# Patient Record
Sex: MALE | URBAN - METROPOLITAN AREA
[De-identification: names, ages, dates, MRNs, and addresses within clinical notes are randomized per-mention and may not be internally consistent; named-entity substitution may affect disease eponyms.]

---

## 2020-07-25 ENCOUNTER — TELEPHONE ENCOUNTER (OUTPATIENT)
Dept: URBAN - METROPOLITAN AREA CLINIC 13 | Facility: CLINIC | Age: 85
End: 2020-07-25

## 2020-07-26 ENCOUNTER — TELEPHONE ENCOUNTER (OUTPATIENT)
Dept: URBAN - METROPOLITAN AREA CLINIC 13 | Facility: CLINIC | Age: 85
End: 2020-07-26

## 2021-08-04 PROCEDURE — U0003 INFECTIOUS AGENT DETECTION BY NUCLEIC ACID (DNA OR RNA); SEVERE ACUTE RESPIRATORY SYNDROME CORONAVIRUS 2 (SARS-COV-2) (CORONAVIRUS DISEASE [COVID-19]), AMPLIFIED PROBE TECHNIQUE, MAKING USE OF HIGH THROUGHPUT TECHNOLOGIES AS DESCRIBED BY CMS-2020-01-R: HCPCS | Performed by: INTERNAL MEDICINE

## 2021-08-04 PROCEDURE — U0005 INFEC AGEN DETEC AMPLI PROBE: HCPCS | Performed by: INTERNAL MEDICINE

## 2021-08-05 ENCOUNTER — LAB REQUISITION (OUTPATIENT)
Dept: LAB | Facility: HOSPITAL | Age: 86
End: 2021-08-05
Payer: MEDICARE

## 2021-08-05 DIAGNOSIS — Z20.828 CONTACT WITH AND (SUSPECTED) EXPOSURE TO OTHER VIRAL COMMUNICABLE DISEASES: ICD-10-CM

## 2021-08-05 LAB — SARS-COV-2 RNA RESP QL NAA+PROBE: NEGATIVE

## 2021-10-11 PROCEDURE — U0005 INFEC AGEN DETEC AMPLI PROBE: HCPCS | Performed by: INTERNAL MEDICINE

## 2021-10-11 PROCEDURE — U0003 INFECTIOUS AGENT DETECTION BY NUCLEIC ACID (DNA OR RNA); SEVERE ACUTE RESPIRATORY SYNDROME CORONAVIRUS 2 (SARS-COV-2) (CORONAVIRUS DISEASE [COVID-19]), AMPLIFIED PROBE TECHNIQUE, MAKING USE OF HIGH THROUGHPUT TECHNOLOGIES AS DESCRIBED BY CMS-2020-01-R: HCPCS | Performed by: INTERNAL MEDICINE

## 2021-10-12 ENCOUNTER — LAB REQUISITION (OUTPATIENT)
Dept: LAB | Facility: HOSPITAL | Age: 86
End: 2021-10-12
Payer: MEDICARE

## 2021-10-12 DIAGNOSIS — Z20.828 CONTACT WITH AND (SUSPECTED) EXPOSURE TO OTHER VIRAL COMMUNICABLE DISEASES: ICD-10-CM

## 2021-10-12 LAB — SARS-COV-2 RNA RESP QL NAA+PROBE: NEGATIVE

## 2021-12-13 PROCEDURE — U0005 INFEC AGEN DETEC AMPLI PROBE: HCPCS | Performed by: INTERNAL MEDICINE

## 2021-12-13 PROCEDURE — U0003 INFECTIOUS AGENT DETECTION BY NUCLEIC ACID (DNA OR RNA); SEVERE ACUTE RESPIRATORY SYNDROME CORONAVIRUS 2 (SARS-COV-2) (CORONAVIRUS DISEASE [COVID-19]), AMPLIFIED PROBE TECHNIQUE, MAKING USE OF HIGH THROUGHPUT TECHNOLOGIES AS DESCRIBED BY CMS-2020-01-R: HCPCS | Performed by: INTERNAL MEDICINE

## 2021-12-14 ENCOUNTER — LAB REQUISITION (OUTPATIENT)
Dept: LAB | Facility: HOSPITAL | Age: 86
End: 2021-12-14
Payer: MEDICARE

## 2021-12-14 DIAGNOSIS — Z11.59 ENCOUNTER FOR SCREENING FOR OTHER VIRAL DISEASES: ICD-10-CM

## 2021-12-14 DIAGNOSIS — Z03.818 ENCOUNTER FOR OBSERVATION FOR SUSPECTED EXPOSURE TO OTHER BIOLOGICAL AGENTS RULED OUT: ICD-10-CM

## 2021-12-14 LAB — SARS-COV-2 RNA RESP QL NAA+PROBE: NEGATIVE

## 2021-12-22 PROCEDURE — U0005 INFEC AGEN DETEC AMPLI PROBE: HCPCS | Performed by: INTERNAL MEDICINE

## 2021-12-22 PROCEDURE — U0003 INFECTIOUS AGENT DETECTION BY NUCLEIC ACID (DNA OR RNA); SEVERE ACUTE RESPIRATORY SYNDROME CORONAVIRUS 2 (SARS-COV-2) (CORONAVIRUS DISEASE [COVID-19]), AMPLIFIED PROBE TECHNIQUE, MAKING USE OF HIGH THROUGHPUT TECHNOLOGIES AS DESCRIBED BY CMS-2020-01-R: HCPCS | Performed by: INTERNAL MEDICINE

## 2021-12-23 ENCOUNTER — LAB REQUISITION (OUTPATIENT)
Dept: LAB | Facility: HOSPITAL | Age: 86
End: 2021-12-23
Payer: MEDICARE

## 2021-12-23 DIAGNOSIS — Z11.59 ENCOUNTER FOR SCREENING FOR OTHER VIRAL DISEASES: ICD-10-CM

## 2021-12-23 DIAGNOSIS — Z03.818 ENCOUNTER FOR OBSERVATION FOR SUSPECTED EXPOSURE TO OTHER BIOLOGICAL AGENTS RULED OUT: ICD-10-CM

## 2021-12-23 DIAGNOSIS — Z20.828 CONTACT WITH AND (SUSPECTED) EXPOSURE TO OTHER VIRAL COMMUNICABLE DISEASES: ICD-10-CM

## 2021-12-23 LAB — SARS-COV-2 RNA RESP QL NAA+PROBE: NEGATIVE

## 2022-01-28 PROCEDURE — U0003 INFECTIOUS AGENT DETECTION BY NUCLEIC ACID (DNA OR RNA); SEVERE ACUTE RESPIRATORY SYNDROME CORONAVIRUS 2 (SARS-COV-2) (CORONAVIRUS DISEASE [COVID-19]), AMPLIFIED PROBE TECHNIQUE, MAKING USE OF HIGH THROUGHPUT TECHNOLOGIES AS DESCRIBED BY CMS-2020-01-R: HCPCS | Performed by: INTERNAL MEDICINE

## 2022-01-28 PROCEDURE — U0005 INFEC AGEN DETEC AMPLI PROBE: HCPCS | Performed by: INTERNAL MEDICINE

## 2022-01-29 ENCOUNTER — LAB REQUISITION (OUTPATIENT)
Dept: LAB | Facility: HOSPITAL | Age: 87
End: 2022-01-29
Payer: MEDICARE

## 2022-01-29 DIAGNOSIS — Z11.59 ENCOUNTER FOR SCREENING FOR OTHER VIRAL DISEASES: ICD-10-CM

## 2022-01-29 DIAGNOSIS — Z20.828 CONTACT WITH AND (SUSPECTED) EXPOSURE TO OTHER VIRAL COMMUNICABLE DISEASES: ICD-10-CM

## 2022-01-29 DIAGNOSIS — Z03.818 ENCOUNTER FOR OBSERVATION FOR SUSPECTED EXPOSURE TO OTHER BIOLOGICAL AGENTS RULED OUT: ICD-10-CM

## 2022-01-29 LAB — SARS-COV-2 RNA RESP QL NAA+PROBE: NEGATIVE

## 2022-02-10 ENCOUNTER — LAB REQUISITION (OUTPATIENT)
Dept: LAB | Facility: HOSPITAL | Age: 87
End: 2022-02-10
Payer: MEDICARE

## 2022-02-10 DIAGNOSIS — Z03.818 ENCOUNTER FOR OBSERVATION FOR SUSPECTED EXPOSURE TO OTHER BIOLOGICAL AGENTS RULED OUT: ICD-10-CM

## 2022-02-10 DIAGNOSIS — Z20.828 CONTACT WITH AND (SUSPECTED) EXPOSURE TO OTHER VIRAL COMMUNICABLE DISEASES: ICD-10-CM

## 2022-02-10 DIAGNOSIS — Z11.59 ENCOUNTER FOR SCREENING FOR OTHER VIRAL DISEASES: ICD-10-CM

## 2022-02-10 PROCEDURE — U0005 INFEC AGEN DETEC AMPLI PROBE: HCPCS | Performed by: INTERNAL MEDICINE

## 2022-02-10 PROCEDURE — U0003 INFECTIOUS AGENT DETECTION BY NUCLEIC ACID (DNA OR RNA); SEVERE ACUTE RESPIRATORY SYNDROME CORONAVIRUS 2 (SARS-COV-2) (CORONAVIRUS DISEASE [COVID-19]), AMPLIFIED PROBE TECHNIQUE, MAKING USE OF HIGH THROUGHPUT TECHNOLOGIES AS DESCRIBED BY CMS-2020-01-R: HCPCS | Performed by: INTERNAL MEDICINE

## 2022-02-11 LAB — SARS-COV-2 RNA RESP QL NAA+PROBE: NEGATIVE

## 2022-02-16 PROCEDURE — U0005 INFEC AGEN DETEC AMPLI PROBE: HCPCS | Performed by: INTERNAL MEDICINE

## 2022-02-16 PROCEDURE — U0003 INFECTIOUS AGENT DETECTION BY NUCLEIC ACID (DNA OR RNA); SEVERE ACUTE RESPIRATORY SYNDROME CORONAVIRUS 2 (SARS-COV-2) (CORONAVIRUS DISEASE [COVID-19]), AMPLIFIED PROBE TECHNIQUE, MAKING USE OF HIGH THROUGHPUT TECHNOLOGIES AS DESCRIBED BY CMS-2020-01-R: HCPCS | Performed by: INTERNAL MEDICINE

## 2022-02-17 ENCOUNTER — LAB REQUISITION (OUTPATIENT)
Dept: LAB | Facility: HOSPITAL | Age: 87
End: 2022-02-17
Payer: MEDICARE

## 2022-02-17 DIAGNOSIS — Z20.828 CONTACT WITH AND (SUSPECTED) EXPOSURE TO OTHER VIRAL COMMUNICABLE DISEASES: ICD-10-CM

## 2022-02-17 LAB — SARS-COV-2 RNA RESP QL NAA+PROBE: NEGATIVE

## 2022-05-12 PROCEDURE — U0005 INFEC AGEN DETEC AMPLI PROBE: HCPCS | Performed by: INTERNAL MEDICINE

## 2022-05-12 PROCEDURE — U0003 INFECTIOUS AGENT DETECTION BY NUCLEIC ACID (DNA OR RNA); SEVERE ACUTE RESPIRATORY SYNDROME CORONAVIRUS 2 (SARS-COV-2) (CORONAVIRUS DISEASE [COVID-19]), AMPLIFIED PROBE TECHNIQUE, MAKING USE OF HIGH THROUGHPUT TECHNOLOGIES AS DESCRIBED BY CMS-2020-01-R: HCPCS | Performed by: INTERNAL MEDICINE

## 2022-05-13 ENCOUNTER — LAB REQUISITION (OUTPATIENT)
Dept: LAB | Facility: HOSPITAL | Age: 87
End: 2022-05-13
Payer: MEDICARE

## 2022-05-13 DIAGNOSIS — Z11.59 ENCOUNTER FOR SCREENING FOR OTHER VIRAL DISEASES: ICD-10-CM

## 2022-05-13 LAB — SARS-COV-2 RNA RESP QL NAA+PROBE: NEGATIVE

## 2022-05-19 PROCEDURE — U0003 INFECTIOUS AGENT DETECTION BY NUCLEIC ACID (DNA OR RNA); SEVERE ACUTE RESPIRATORY SYNDROME CORONAVIRUS 2 (SARS-COV-2) (CORONAVIRUS DISEASE [COVID-19]), AMPLIFIED PROBE TECHNIQUE, MAKING USE OF HIGH THROUGHPUT TECHNOLOGIES AS DESCRIBED BY CMS-2020-01-R: HCPCS | Performed by: INTERNAL MEDICINE

## 2022-05-19 PROCEDURE — U0005 INFEC AGEN DETEC AMPLI PROBE: HCPCS | Performed by: INTERNAL MEDICINE

## 2022-05-20 ENCOUNTER — LAB REQUISITION (OUTPATIENT)
Dept: LAB | Facility: HOSPITAL | Age: 87
End: 2022-05-20
Payer: MEDICARE

## 2022-05-20 DIAGNOSIS — Z11.59 ENCOUNTER FOR SCREENING FOR OTHER VIRAL DISEASES: ICD-10-CM

## 2022-05-20 DIAGNOSIS — Z20.828 CONTACT WITH AND (SUSPECTED) EXPOSURE TO OTHER VIRAL COMMUNICABLE DISEASES: ICD-10-CM

## 2022-05-20 DIAGNOSIS — Z03.818 ENCOUNTER FOR OBSERVATION FOR SUSPECTED EXPOSURE TO OTHER BIOLOGICAL AGENTS RULED OUT: ICD-10-CM

## 2022-05-21 LAB — SARS-COV-2 RNA RESP QL NAA+PROBE: NEGATIVE

## 2022-05-26 PROCEDURE — U0003 INFECTIOUS AGENT DETECTION BY NUCLEIC ACID (DNA OR RNA); SEVERE ACUTE RESPIRATORY SYNDROME CORONAVIRUS 2 (SARS-COV-2) (CORONAVIRUS DISEASE [COVID-19]), AMPLIFIED PROBE TECHNIQUE, MAKING USE OF HIGH THROUGHPUT TECHNOLOGIES AS DESCRIBED BY CMS-2020-01-R: HCPCS | Performed by: INTERNAL MEDICINE

## 2022-05-26 PROCEDURE — U0005 INFEC AGEN DETEC AMPLI PROBE: HCPCS | Performed by: INTERNAL MEDICINE

## 2022-05-27 ENCOUNTER — LAB REQUISITION (OUTPATIENT)
Dept: LAB | Facility: HOSPITAL | Age: 87
End: 2022-05-27
Payer: MEDICARE

## 2022-05-27 DIAGNOSIS — Z20.828 CONTACT WITH AND (SUSPECTED) EXPOSURE TO OTHER VIRAL COMMUNICABLE DISEASES: ICD-10-CM

## 2022-05-27 LAB — SARS-COV-2 RNA RESP QL NAA+PROBE: NEGATIVE

## 2022-06-05 PROCEDURE — U0003 INFECTIOUS AGENT DETECTION BY NUCLEIC ACID (DNA OR RNA); SEVERE ACUTE RESPIRATORY SYNDROME CORONAVIRUS 2 (SARS-COV-2) (CORONAVIRUS DISEASE [COVID-19]), AMPLIFIED PROBE TECHNIQUE, MAKING USE OF HIGH THROUGHPUT TECHNOLOGIES AS DESCRIBED BY CMS-2020-01-R: HCPCS | Performed by: INTERNAL MEDICINE

## 2022-06-05 PROCEDURE — U0005 INFEC AGEN DETEC AMPLI PROBE: HCPCS | Performed by: INTERNAL MEDICINE

## 2022-06-06 ENCOUNTER — LAB REQUISITION (OUTPATIENT)
Dept: LAB | Facility: HOSPITAL | Age: 87
End: 2022-06-06
Payer: MEDICARE

## 2022-06-06 DIAGNOSIS — Z20.828 CONTACT WITH AND (SUSPECTED) EXPOSURE TO OTHER VIRAL COMMUNICABLE DISEASES: ICD-10-CM

## 2022-06-06 LAB — SARS-COV-2 RNA RESP QL NAA+PROBE: NEGATIVE

## 2022-06-30 ENCOUNTER — HOSPITAL ENCOUNTER (EMERGENCY)
Facility: HOSPITAL | Age: 87
Discharge: HOME | End: 2022-06-30
Attending: EMERGENCY MEDICINE
Payer: MEDICARE

## 2022-06-30 ENCOUNTER — APPOINTMENT (EMERGENCY)
Dept: CT IMAGING | Facility: HOSPITAL | Age: 87
End: 2022-06-30
Payer: MEDICARE

## 2022-06-30 ENCOUNTER — APPOINTMENT (EMERGENCY)
Dept: RADIOLOGY | Facility: HOSPITAL | Age: 87
End: 2022-06-30
Payer: MEDICARE

## 2022-06-30 VITALS
RESPIRATION RATE: 18 BRPM | HEART RATE: 91 BPM | SYSTOLIC BLOOD PRESSURE: 132 MMHG | TEMPERATURE: 98.5 F | DIASTOLIC BLOOD PRESSURE: 78 MMHG | OXYGEN SATURATION: 91 %
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
## 2022-06-30 DIAGNOSIS — S86.912A MUSCLE STRAIN OF LEFT LOWER LEG, INITIAL ENCOUNTER: ICD-10-CM

## 2022-06-30 LAB
ABO GROUP BLD: NORMAL
ABO GROUP BLD: NORMAL
ANION GAP SERPL CALCULATED.3IONS-SCNC: 11 MMOL/L (ref 4–13)
BASOPHILS # BLD AUTO: 0.02 THOUSANDS/ΜL (ref 0–0.1)
BASOPHILS NFR BLD AUTO: 0 % (ref 0–1)
BLD GP AB SCN SERPL QL: NEGATIVE
BUN SERPL-MCNC: 22 MG/DL (ref 5–25)
CALCIUM SERPL-MCNC: 9.5 MG/DL (ref 8.3–10.1)
CHLORIDE SERPL-SCNC: 104 MMOL/L (ref 100–108)
CO2 SERPL-SCNC: 22 MMOL/L (ref 21–32)
CREAT SERPL-MCNC: 1.38 MG/DL (ref 0.6–1.3)
EOSINOPHIL # BLD AUTO: 0.14 THOUSAND/ΜL (ref 0–0.61)
EOSINOPHIL NFR BLD AUTO: 2 % (ref 0–6)
ERYTHROCYTE [DISTWIDTH] IN BLOOD BY AUTOMATED COUNT: 13.3 % (ref 11.6–15.1)
GFR SERPL CREATININE-BSD FRML MDRD: 42 ML/MIN/1.73SQ M
GLUCOSE SERPL-MCNC: 120 MG/DL (ref 65–140)
HCT VFR BLD AUTO: 45.3 % (ref 36.5–49.3)
HGB BLD-MCNC: 15.3 G/DL (ref 12–17)
IMM GRANULOCYTES # BLD AUTO: 0.03 THOUSAND/UL (ref 0–0.2)
IMM GRANULOCYTES NFR BLD AUTO: 0 % (ref 0–2)
LYMPHOCYTES # BLD AUTO: 0.92 THOUSANDS/ΜL (ref 0.6–4.47)
LYMPHOCYTES NFR BLD AUTO: 13 % (ref 14–44)
MCH RBC QN AUTO: 31.5 PG (ref 26.8–34.3)
MCHC RBC AUTO-ENTMCNC: 33.8 G/DL (ref 31.4–37.4)
MCV RBC AUTO: 93 FL (ref 82–98)
MONOCYTES # BLD AUTO: 0.65 THOUSAND/ΜL (ref 0.17–1.22)
MONOCYTES NFR BLD AUTO: 10 % (ref 4–12)
NEUTROPHILS # BLD AUTO: 5.09 THOUSANDS/ΜL (ref 1.85–7.62)
NEUTS SEG NFR BLD AUTO: 75 % (ref 43–75)
NRBC BLD AUTO-RTO: 0 /100 WBCS
PLATELET # BLD AUTO: 173 THOUSANDS/UL (ref 149–390)
PMV BLD AUTO: 8.4 FL (ref 8.9–12.7)
POTASSIUM SERPL-SCNC: 3.8 MMOL/L (ref 3.5–5.3)
RBC # BLD AUTO: 4.86 MILLION/UL (ref 3.88–5.62)
RH BLD: POSITIVE
RH BLD: POSITIVE
SODIUM SERPL-SCNC: 137 MMOL/L (ref 136–145)
SPECIMEN EXPIRATION DATE: NORMAL
WBC # BLD AUTO: 6.85 THOUSAND/UL (ref 4.31–10.16)

## 2022-06-30 PROCEDURE — 99284 EMERGENCY DEPT VISIT MOD MDM: CPT

## 2022-06-30 PROCEDURE — 86901 BLOOD TYPING SEROLOGIC RH(D): CPT | Performed by: EMERGENCY MEDICINE

## 2022-06-30 PROCEDURE — 36415 COLL VENOUS BLD VENIPUNCTURE: CPT | Performed by: EMERGENCY MEDICINE

## 2022-06-30 PROCEDURE — 73590 X-RAY EXAM OF LOWER LEG: CPT

## 2022-06-30 PROCEDURE — 86900 BLOOD TYPING SEROLOGIC ABO: CPT | Performed by: EMERGENCY MEDICINE

## 2022-06-30 PROCEDURE — 72170 X-RAY EXAM OF PELVIS: CPT

## 2022-06-30 PROCEDURE — 99285 EMERGENCY DEPT VISIT HI MDM: CPT | Performed by: EMERGENCY MEDICINE

## 2022-06-30 PROCEDURE — 71045 X-RAY EXAM CHEST 1 VIEW: CPT

## 2022-06-30 PROCEDURE — G1004 CDSM NDSC: HCPCS

## 2022-06-30 PROCEDURE — 71250 CT THORAX DX C-: CPT

## 2022-06-30 PROCEDURE — 86850 RBC ANTIBODY SCREEN: CPT | Performed by: EMERGENCY MEDICINE

## 2022-06-30 PROCEDURE — 80048 BASIC METABOLIC PNL TOTAL CA: CPT | Performed by: EMERGENCY MEDICINE

## 2022-06-30 PROCEDURE — 85025 COMPLETE CBC W/AUTO DIFF WBC: CPT | Performed by: EMERGENCY MEDICINE

## 2022-06-30 RX ORDER — ACETAMINOPHEN 325 MG/1
650 TABLET ORAL ONCE
Status: COMPLETED | OUTPATIENT
Start: 2022-06-30 | End: 2022-06-30

## 2022-06-30 RX ORDER — NAPROXEN 250 MG/1
250 TABLET ORAL 2 TIMES DAILY WITH MEALS
Qty: 8 TABLET | Refills: 0 | Status: SHIPPED | OUTPATIENT
Start: 2022-06-30 | End: 2022-07-28

## 2022-06-30 RX ORDER — NAPROXEN 250 MG/1
250 TABLET ORAL 2 TIMES DAILY WITH MEALS
Qty: 8 TABLET | Refills: 0 | Status: SHIPPED | OUTPATIENT
Start: 2022-06-30 | End: 2022-06-30 | Stop reason: SDUPTHER

## 2022-06-30 RX ADMIN — ACETAMINOPHEN 650 MG: 325 TABLET, FILM COATED ORAL at 11:22

## 2022-06-30 NOTE — TRAUMA DOCUMENTATION
Patient unable to bare weight on left leg for ambulation  Provider aware and was told to call nursing home to see if they can accommodate  Was transferred to [facility] ([phone]) and got no answer  Will try again shortly

## 2022-06-30 NOTE — ED PROVIDER NOTES
Emergency Department Trauma Note  Bernard Cornejo 80 y o  male MRN: [id number]  Unit/Bed#: Z1 H3/Z1 H3 Encounter: [id number]      Trauma Alert: Trauma Acuity: Trauma Evaluation  Model of Arrival:   via    Trauma Team: Current Providers  Attending Provider: Malika Angulo DO  Registered Nurse: Jefry Gilbert, RN  Registered Nurse: Gabriella Freeman RN  Consultants:     None      History of Present Illness     Chief Complaint:   Chief Complaint   Patient presents with    Fall     Patient presents to the ER with left lower leg pain post falling yesterday afternoon  Patient states:  "I can't bear any weight on my leg "     HPI:  Bernard Cornejo is a 80 y o  male who presents with left leg pain  Mechanism:Details of Incident: Patient presents to the ER with left lower leg extremity pain post falling at [facility] yesterday afternoon  Injury Date: 06/29/22 Injury Time: 1400 Injury Occurence Location - [address] [place] [address] Way: [facility]    51-year-old man says he was sleeping in his chair  When he got up to go to the bathroom he twisted and fell to the ground  He has no pain until he moves  When he moves he has pain in left tib-fib region  Unable to stand due to pain  Denies injuring head, neck, chest, abdomen, pelvis, back or other extremities  Does have chronic back pain  On no thinners, but does take aspirin 81 milligrams daily  Denies dyspnea and focal neurologic changes  Review of Systems   Constitutional: Negative for fever and unexpected weight change  HENT: Positive for hearing loss  Respiratory: Negative for cough and shortness of breath  Cardiovascular: Negative for chest pain and palpitations  Gastrointestinal: Negative for abdominal pain, blood in stool, diarrhea and vomiting  Genitourinary: Negative for dysuria and flank pain  Musculoskeletal: Positive for back pain, gait problem and myalgias  Neurological: Positive for weakness   Negative for syncope, numbness and headaches  All other systems reviewed and are negative  Historical Information     Immunizations:   Immunization History   Administered Date(s) Administered    COVID-19 MODERNA VACC 0 5 ML IM 01/28/2021, 03/08/2021       Past Medical History:   Diagnosis Date    CKD (chronic kidney disease) stage 3, GFR 30-59 ml/min ([facility])     Essential (primary) hypertension     Hyperlipidemia     Polyp of colon     Venous insufficiency of lower extremity, unspecified laterality      History reviewed  No pertinent family history  History reviewed  No pertinent surgical history  Social History     Tobacco Use    Smoking status: Never Smoker    Smokeless tobacco: Never Used   Vaping Use    Vaping Use: Never used   Substance Use Topics    Drug use: Never     E-Cigarette/Vaping    E-Cigarette Use Never User      E-Cigarette/Vaping Substances       Family History: non-contributory    Meds/Allergies   None       No Known Allergies    PHYSICAL EXAM    PE limited by: left leg pain with weight beqring    Objective   Vitals:   First set: Temperature: 98 5 °F (36 9 °C) (06/30/22 0737)  Pulse: 96 (06/30/22 0737)  Respirations: 18 (06/30/22 0737)  Blood Pressure: (!) 182/89 (06/30/22 0737)  SpO2: 92 % (06/30/22 0737)    Primary Survey:   (A) Airway: normal vocalizations  (B) Breathing: symmetric air entry and chest expansion  (C) Circulation: Pulses:   normal  (D) Disabliity:  GCS Total:  14  (E) Expose:  Completed    Secondary Survey: (Click on Physical Exam tab above)  Physical Exam  Vitals and nursing note reviewed  Constitutional:       General: He is not in acute distress  Appearance: He is well-developed  He is not ill-appearing or diaphoretic  HENT:      Head: Normocephalic and atraumatic  Right Ear: External ear normal       Left Ear: External ear normal       Nose: Nose normal    Eyes:      General: No scleral icterus       Conjunctiva/sclera: Conjunctivae normal       Pupils: Pupils are equal, round, and reactive to light  Neck:      Vascular: No JVD  Cardiovascular:      Rate and Rhythm: Normal rate and regular rhythm  Pulses: Normal pulses  Heart sounds: Normal heart sounds  No murmur heard  Pulmonary:      Effort: Pulmonary effort is normal       Breath sounds: Normal breath sounds  Chest:      Chest wall: No tenderness  Abdominal:      General: Bowel sounds are normal       Palpations: Abdomen is soft  There is no mass  Tenderness: There is no abdominal tenderness  There is no guarding or rebound  Musculoskeletal:         General: No swelling, tenderness or deformity  Normal range of motion  Cervical back: Normal range of motion and neck supple  No rigidity or tenderness  Right lower leg: No edema  Left lower leg: No edema  Lymphadenopathy:      Cervical: No cervical adenopathy  Skin:     General: Skin is warm and dry  Capillary Refill: Capillary refill takes less than 2 seconds  Findings: No lesion or rash  Neurological:      Mental Status: He is alert and oriented to person, place, and time  Mental status is at baseline  Cranial Nerves: No cranial nerve deficit (except for hearing loss)  Sensory: No sensory deficit  Motor: No weakness  Coordination: Coordination normal       Gait: Gait abnormal (unable to weight bear d/t left lower leg pain  )  Deep Tendon Reflexes: Reflexes are normal and symmetric  Reflexes normal    Psychiatric:         Mood and Affect: Mood normal          Behavior: Behavior normal          Cervical spine cleared by clinical criteria?  Yes     Invasive Devices  Report    None                 Lab Results:   Results Reviewed     Procedure Component Value Units Date/Time    Basic metabolic panel [[id number]]  (Abnormal) Collected: 06/30/22 0758    Lab Status: Final result Specimen: Blood from Arm, Right Updated: 06/30/22 0190     Sodium 137 mmol/L      Potassium 3 8 mmol/L      Chloride 104 mmol/L      CO2 22 mmol/L      ANION GAP 11 mmol/L      BUN 22 mg/dL      Creatinine 1 38 mg/dL      Glucose 120 mg/dL      Calcium 9 5 mg/dL      eGFR 42 ml/min/1 73sq m     Narrative:      National Kidney Disease Foundation guidelines for Chronic Kidney Disease (CKD):     Stage 1 with normal or high GFR (GFR > 90 mL/min/1 73 square meters)    Stage 2 Mild CKD (GFR = 60-89 mL/min/1 73 square meters)    Stage 3A Moderate CKD (GFR = 45-59 mL/min/1 73 square meters)    Stage 3B Moderate CKD (GFR = 30-44 mL/min/1 73 square meters)    Stage 4 Severe CKD (GFR = 15-29 mL/min/1 73 square meters)    Stage 5 End Stage CKD (GFR <15 mL/min/1 73 square meters)  Note: GFR calculation is accurate only with a steady state creatinine    CBC and differential [[id number]]  (Abnormal) Collected: 06/30/22 0758    Lab Status: Final result Specimen: Blood from Arm, Right Updated: 06/30/22 0805     WBC 6 85 Thousand/uL      RBC 4 86 Million/uL      Hemoglobin 15 3 g/dL      Hematocrit 45 3 %      MCV 93 fL      MCH 31 5 pg      MCHC 33 8 g/dL      RDW 13 3 %      MPV 8 4 fL      Platelets 564 Thousands/uL      nRBC 0 /100 WBCs      Neutrophils Relative 75 %      Immat GRANS % 0 %      Lymphocytes Relative 13 %      Monocytes Relative 10 %      Eosinophils Relative 2 %      Basophils Relative 0 %      Neutrophils Absolute 5 09 Thousands/µL      Immature Grans Absolute 0 03 Thousand/uL      Lymphocytes Absolute 0 92 Thousands/µL      Monocytes Absolute 0 65 Thousand/µL      Eosinophils Absolute 0 14 Thousand/µL      Basophils Absolute 0 02 Thousands/µL                  Imaging Studies:   Direct to CT: No  CT chest without contrast   Final Result by Radha Cho MD (06/30 1022)      1  Widened mediastinum appearance on x-ray corresponds to tortuous vasculature  2   Mild bibasilar density likely representing atelectasis        3    Indeterminate hyperdense right renal lesion with additional probable bilateral cysts and hepatic cysts  Follow-up nonemergent complete abdomen ultrasound is recommended  Considerations related to the patient's age and/or comorbidities may be used    to alter this recommendation  4   Cholelithiasis  Workstation performed: [id number]         XR tibia fibula 2 views LEFT   ED Interpretation by Farnaz Coombs DO (06/30 4825)   No acute fracture      Final Result by Devin Lara DO (06/30 1009)      No acute osseous abnormality  Workstation performed: [id number]         XR Trauma chest portable   Final Result by Yoan Ames DO (06/30 4864)      Widening of the superior mediastinum, nonspecific  Noncontrast CT chest may be considered for further evaluation  Increased opacity in the medial lung bases could represent atelectasis or infiltrates  The study was marked in [facility] for immediate notification  Workstation performed: [id number]         XR Trauma pelvis ap only 1 or 2 vw   Final Result by Yoan Ames DO (06/30 5301)      No fracture  Followup imaging may be considered for any persistent or worsening symptoms  Workstation performed: [id number]               Procedures  Procedures         ED Course           MDM  Number of Diagnoses or Management Options  Fall, initial encounter: new and requires workup  Muscle strain of left lower leg, initial encounter: new and requires workup  Diagnosis management comments: Mechanical ground-level fall  Only injury is to left lower leg  Patient otherwise stable and at baseline  Check labs, EKG, imaging  Labs at patient's normal values  No fracture/dislocation noted  Chest CT reassuring  Patient's nursing home able to attend to patient's PT/OT needs  Will accept him back via wheelchair van             Amount and/or Complexity of Data Reviewed  Clinical lab tests: ordered and reviewed  Tests in the radiology section of CPT®: ordered and reviewed  Review and summarize past medical records: yes  Independent visualization of images, tracings, or specimens: yes            Disposition  [facility] One Transfer: No  Final diagnoses:   Fall, initial encounter   Muscle strain of left lower leg, initial encounter     Time reflects when diagnosis was documented in both MDM as applicable and the Disposition within this note     Time User Action Codes Description Comment    6/30/2022 10:57 AM Evi Kothari Add [Y10  GUMG] Fall, initial encounter     6/30/2022 10:57 AM Evi Kothari Add [P14 750F] Muscle strain of left lower leg, initial encounter       ED Disposition     ED Disposition   Discharge    Condition   Stable    Date/Time   Thu Jun 30, 2022 10:57 AM    Comment   Andree Nava discharge to home/self care  Follow-up Information     Follow up With Specialties Details Why Contact Info    Your PCP  Call  As needed         Discharge Medication List as of 6/30/2022 11:37 AM      START taking these medications    Details   naproxen (Naprosyn) 250 mg tablet Take 1 tablet (250 mg total) by mouth 2 (two) times a day with meals for 4 days, Starting Thu 6/30/2022, Until Mon 7/4/2022, Print           No discharge procedures on file      PDMP Review     None          ED Provider  Electronically Signed by         Kristine Ocampo DO  07/02/22 9519

## 2022-07-01 ENCOUNTER — HOSPITAL ENCOUNTER (EMERGENCY)
Facility: HOSPITAL | Age: 87
Discharge: HOME | End: 2022-07-01
Attending: EMERGENCY MEDICINE
Payer: MEDICARE

## 2022-07-01 VITALS
TEMPERATURE: 97.7 F | DIASTOLIC BLOOD PRESSURE: 78 MMHG | OXYGEN SATURATION: 93 % | SYSTOLIC BLOOD PRESSURE: 153 MMHG | HEART RATE: 87 BPM | RESPIRATION RATE: 18 BRPM

## 2022-07-01 DIAGNOSIS — I95.9 HYPOTENSION, UNSPECIFIED HYPOTENSION TYPE: Primary | ICD-10-CM

## 2022-07-01 LAB
ATRIAL RATE: 95 BPM
P AXIS: 37 DEGREES
PR INTERVAL: 180 MS
QRS AXIS: -89 DEGREES
QRSD INTERVAL: 74 MS
QT INTERVAL: 318 MS
QTC INTERVAL: 399 MS
T WAVE AXIS: 47 DEGREES
VENTRICULAR RATE: 95 BPM

## 2022-07-01 PROCEDURE — 99284 EMERGENCY DEPT VISIT MOD MDM: CPT

## 2022-07-01 PROCEDURE — 93005 ELECTROCARDIOGRAM TRACING: CPT

## 2022-07-01 PROCEDURE — 99283 EMERGENCY DEPT VISIT LOW MDM: CPT | Performed by: EMERGENCY MEDICINE

## 2022-07-01 PROCEDURE — 93010 ELECTROCARDIOGRAM REPORT: CPT | Performed by: INTERNAL MEDICINE

## 2022-07-01 NOTE — ED PROVIDER NOTES
History  Chief Complaint   Patient presents with    Hypotension     Patient presents to the ED with c/o hypotension, son states patient was seen yesterday for a fall and when the staff at [address] [place] [address] was checking him out this AM his vital signs (BP and o2) were low      80year-old male, presents with son from assisted living facility for reported low blood pressure  Patient was seen in ED yesterday for fall  Patient is awake and alert and denies any complaints, signs states they checked his blood pressure earlier today and noted to be low  They checked it again at the facility and the blood pressure returned to normal, the facility still recommended patient be evaluated in emergency department  Patient denies any complaints, no lightheadedness, chest pain, or shortness of breath  History provided by:  Patient and relative   used: No        Prior to Admission Medications   Prescriptions Last Dose Informant Patient Reported? Taking?   naproxen (Naprosyn) 250 mg tablet   No No   Sig: Take 1 tablet (250 mg total) by mouth 2 (two) times a day with meals for 4 days      Facility-Administered Medications: None       Past Medical History:   Diagnosis Date    CKD (chronic kidney disease) stage 3, GFR 30-59 ml/min ([facility])     Essential (primary) hypertension     Hyperlipidemia     Polyp of colon     Venous insufficiency of lower extremity, unspecified laterality        History reviewed  No pertinent surgical history  History reviewed  No pertinent family history  I have reviewed and agree with the history as documented  E-Cigarette/Vaping    E-Cigarette Use Never User      E-Cigarette/Vaping Substances     Social History     Tobacco Use    Smoking status: Never Smoker    Smokeless tobacco: Never Used   Vaping Use    Vaping Use: Never used   Substance Use Topics    Drug use: Never       Review of Systems   Constitutional: Negative  Negative for fever  Respiratory: Negative  Cardiovascular: Negative  Gastrointestinal: Negative  Neurological: Negative  Physical Exam  Physical Exam  Vitals and nursing note reviewed  Constitutional:       General: He is not in acute distress  HENT:      Head: Normocephalic and atraumatic  Cardiovascular:      Rate and Rhythm: Normal rate and regular rhythm  Pulmonary:      Effort: Pulmonary effort is normal       Breath sounds: Normal breath sounds  Abdominal:      Palpations: Abdomen is soft  Tenderness: There is no abdominal tenderness  Neurological:      General: No focal deficit present  Mental Status: He is alert  Mental status is at baseline  Vital Signs  ED Triage Vitals [07/01/22 1342]   Temperature Pulse Respirations Blood Pressure SpO2   97 7 °F (36 5 °C) 97 18 134/79 92 %      Temp Source Heart Rate Source Patient Position - Orthostatic VS BP Location FiO2 (%)   Temporal Monitor Sitting Right arm --      Pain Score       No Pain           Vitals:    07/01/22 1342 07/01/22 1621   BP: 134/79 153/78   Pulse: 97 87   Patient Position - Orthostatic VS: Sitting Sitting         Visual Acuity      ED Medications  Medications - No data to display    Diagnostic Studies  Results Reviewed     None                 No orders to display              Procedures  Procedures         ED Course                                             MDM  Number of Diagnoses or Management Options  Hypotension, unspecified hypotension type  Diagnosis management comments: 80year-old male, presents with episode of reported low blood pressure  Differential diagnosis includes dehydration, sepsis among other diagnosis  Patient's blood pressure has been normal in ED, patient is awake alert with no complaints  Blood work performed yesterday reviewed  No indication for further workup in the emergency department, discussed with son who was present and will discharge back to assisted living facility to which son is in agreement with  Amount and/or Complexity of Data Reviewed  Review and summarize past medical records: yes        Disposition  Final diagnoses:   Hypotension, unspecified hypotension type     Time reflects when diagnosis was documented in both MDM as applicable and the Disposition within this note     Time User Action Codes Description Comment    7/1/2022  4:20 PM Moody Dandy Add [I95 9] Hypotension, unspecified hypotension type       ED Disposition     ED Disposition   Discharge    Condition   Stable    Date/Time   Fri Jul 1, 2022  4:20 PM    Comment   Summer Gray discharge to home/self care  Follow-up Information    None         Discharge Medication List as of 7/1/2022  4:21 PM      CONTINUE these medications which have NOT CHANGED    Details   naproxen (Naprosyn) 250 mg tablet Take 1 tablet (250 mg total) by mouth 2 (two) times a day with meals for 4 days, Starting Thu 6/30/2022, Until Mon 7/4/2022, Print             No discharge procedures on file      PDMP Review     None          ED Provider  Electronically Signed by           Danilo Damico MD  07/01/22 8753

## 2022-07-25 ENCOUNTER — APPOINTMENT (EMERGENCY)
Dept: RADIOLOGY | Facility: HOSPITAL | Age: 87
DRG: 640 | End: 2022-07-25
Payer: MEDICARE

## 2022-07-25 ENCOUNTER — HOSPITAL ENCOUNTER (INPATIENT)
Facility: HOSPITAL | Age: 87
LOS: 2 days | Discharge: NURSING, REHAB OR LONG-TERM CARE | DRG: 640 | End: 2022-07-28
Attending: EMERGENCY MEDICINE | Admitting: INTERNAL MEDICINE
Payer: MEDICARE

## 2022-07-25 DIAGNOSIS — E16.2 HYPOGLYCEMIA: ICD-10-CM

## 2022-07-25 DIAGNOSIS — R79.89 DECREASED THYROID STIMULATING HORMONE (TSH) LEVEL: ICD-10-CM

## 2022-07-25 DIAGNOSIS — N17.9 AKI (ACUTE KIDNEY INJURY) (HCC): Primary | ICD-10-CM

## 2022-07-25 LAB — GLUCOSE SERPL-MCNC: 128 MG/DL (ref 65–140)

## 2022-07-25 PROCEDURE — 84484 ASSAY OF TROPONIN QUANT: CPT | Performed by: EMERGENCY MEDICINE

## 2022-07-25 PROCEDURE — 84439 ASSAY OF FREE THYROXINE: CPT | Performed by: EMERGENCY MEDICINE

## 2022-07-25 PROCEDURE — 1123F ACP DISCUSS/DSCN MKR DOCD: CPT | Performed by: EMERGENCY MEDICINE

## 2022-07-25 PROCEDURE — 83735 ASSAY OF MAGNESIUM: CPT | Performed by: EMERGENCY MEDICINE

## 2022-07-25 PROCEDURE — 85025 COMPLETE CBC W/AUTO DIFF WBC: CPT | Performed by: EMERGENCY MEDICINE

## 2022-07-25 PROCEDURE — 80053 COMPREHEN METABOLIC PANEL: CPT | Performed by: EMERGENCY MEDICINE

## 2022-07-25 PROCEDURE — 83036 HEMOGLOBIN GLYCOSYLATED A1C: CPT | Performed by: INTERNAL MEDICINE

## 2022-07-25 PROCEDURE — U0005 INFEC AGEN DETEC AMPLI PROBE: HCPCS | Performed by: EMERGENCY MEDICINE

## 2022-07-25 PROCEDURE — 83880 ASSAY OF NATRIURETIC PEPTIDE: CPT | Performed by: EMERGENCY MEDICINE

## 2022-07-25 PROCEDURE — U0003 INFECTIOUS AGENT DETECTION BY NUCLEIC ACID (DNA OR RNA); SEVERE ACUTE RESPIRATORY SYNDROME CORONAVIRUS 2 (SARS-COV-2) (CORONAVIRUS DISEASE [COVID-19]), AMPLIFIED PROBE TECHNIQUE, MAKING USE OF HIGH THROUGHPUT TECHNOLOGIES AS DESCRIBED BY CMS-2020-01-R: HCPCS | Performed by: EMERGENCY MEDICINE

## 2022-07-25 PROCEDURE — 71045 X-RAY EXAM CHEST 1 VIEW: CPT

## 2022-07-25 PROCEDURE — 99285 EMERGENCY DEPT VISIT HI MDM: CPT

## 2022-07-25 PROCEDURE — 93005 ELECTROCARDIOGRAM TRACING: CPT

## 2022-07-25 PROCEDURE — 84443 ASSAY THYROID STIM HORMONE: CPT | Performed by: EMERGENCY MEDICINE

## 2022-07-25 PROCEDURE — 99291 CRITICAL CARE FIRST HOUR: CPT | Performed by: EMERGENCY MEDICINE

## 2022-07-25 PROCEDURE — 36415 COLL VENOUS BLD VENIPUNCTURE: CPT | Performed by: EMERGENCY MEDICINE

## 2022-07-25 PROCEDURE — 82948 REAGENT STRIP/BLOOD GLUCOSE: CPT

## 2022-07-26 ENCOUNTER — APPOINTMENT (EMERGENCY)
Dept: CT IMAGING | Facility: HOSPITAL | Age: 87
DRG: 640 | End: 2022-07-26
Payer: MEDICARE

## 2022-07-26 PROBLEM — E16.2 HYPOGLYCEMIA: Status: ACTIVE | Noted: 2022-07-26

## 2022-07-26 PROBLEM — I10 ESSENTIAL HYPERTENSION: Status: ACTIVE | Noted: 2022-07-26

## 2022-07-26 PROBLEM — N17.9 ACUTE KIDNEY INJURY SUPERIMPOSED ON CHRONIC KIDNEY DISEASE (HCC): Status: ACTIVE | Noted: 2022-07-26

## 2022-07-26 PROBLEM — R65.10 SIRS (SYSTEMIC INFLAMMATORY RESPONSE SYNDROME) (HCC): Status: ACTIVE | Noted: 2022-07-26

## 2022-07-26 PROBLEM — F10.10 ALCOHOL ABUSE: Status: ACTIVE | Noted: 2022-07-26

## 2022-07-26 PROBLEM — N18.9 ACUTE KIDNEY INJURY SUPERIMPOSED ON CHRONIC KIDNEY DISEASE (HCC): Status: ACTIVE | Noted: 2022-07-26

## 2022-07-26 PROBLEM — E78.5 HYPERLIPIDEMIA: Status: ACTIVE | Noted: 2022-07-26

## 2022-07-26 PROBLEM — R41.82 AMS (ALTERED MENTAL STATUS): Status: ACTIVE | Noted: 2022-07-26

## 2022-07-26 PROBLEM — N40.0 BPH (BENIGN PROSTATIC HYPERPLASIA): Status: ACTIVE | Noted: 2022-07-26

## 2022-07-26 LAB
2HR DELTA HS TROPONIN: 4 NG/L
4HR DELTA HS TROPONIN: 6 NG/L
ALBUMIN SERPL BCP-MCNC: 3.4 G/DL (ref 3.5–5)
ALP SERPL-CCNC: 158 U/L (ref 46–116)
ALT SERPL W P-5'-P-CCNC: 12 U/L (ref 12–78)
ANION GAP SERPL CALCULATED.3IONS-SCNC: 10 MMOL/L (ref 4–13)
ANION GAP SERPL CALCULATED.3IONS-SCNC: 8 MMOL/L (ref 4–13)
AST SERPL W P-5'-P-CCNC: 13 U/L (ref 5–45)
ATRIAL RATE: 85 BPM
BASOPHILS # BLD AUTO: 0.01 THOUSANDS/ΜL (ref 0–0.1)
BASOPHILS # BLD AUTO: 0.02 THOUSANDS/ΜL (ref 0–0.1)
BASOPHILS NFR BLD AUTO: 0 % (ref 0–1)
BASOPHILS NFR BLD AUTO: 0 % (ref 0–1)
BILIRUB SERPL-MCNC: 0.5 MG/DL (ref 0.2–1)
BUN SERPL-MCNC: 41 MG/DL (ref 5–25)
BUN SERPL-MCNC: 46 MG/DL (ref 5–25)
CALCIUM ALBUM COR SERPL-MCNC: 9.7 MG/DL (ref 8.3–10.1)
CALCIUM SERPL-MCNC: 9.2 MG/DL (ref 8.3–10.1)
CALCIUM SERPL-MCNC: 9.4 MG/DL (ref 8.3–10.1)
CARDIAC TROPONIN I PNL SERPL HS: 11 NG/L
CARDIAC TROPONIN I PNL SERPL HS: 15 NG/L
CARDIAC TROPONIN I PNL SERPL HS: 17 NG/L
CHLORIDE SERPL-SCNC: 104 MMOL/L (ref 96–108)
CHLORIDE SERPL-SCNC: 105 MMOL/L (ref 96–108)
CO2 SERPL-SCNC: 24 MMOL/L (ref 21–32)
CO2 SERPL-SCNC: 25 MMOL/L (ref 21–32)
CREAT SERPL-MCNC: 1.94 MG/DL (ref 0.6–1.3)
CREAT SERPL-MCNC: 2.2 MG/DL (ref 0.6–1.3)
EOSINOPHIL # BLD AUTO: 0.03 THOUSAND/ΜL (ref 0–0.61)
EOSINOPHIL # BLD AUTO: 0.05 THOUSAND/ΜL (ref 0–0.61)
EOSINOPHIL NFR BLD AUTO: 1 % (ref 0–6)
EOSINOPHIL NFR BLD AUTO: 1 % (ref 0–6)
ERYTHROCYTE [DISTWIDTH] IN BLOOD BY AUTOMATED COUNT: 13.5 % (ref 11.6–15.1)
ERYTHROCYTE [DISTWIDTH] IN BLOOD BY AUTOMATED COUNT: 13.6 % (ref 11.6–15.1)
EST. AVERAGE GLUCOSE BLD GHB EST-MCNC: 111 MG/DL
GFR SERPL CREATININE-BSD FRML MDRD: 24 ML/MIN/1.73SQ M
GFR SERPL CREATININE-BSD FRML MDRD: 28 ML/MIN/1.73SQ M
GLUCOSE SERPL-MCNC: 100 MG/DL (ref 65–140)
GLUCOSE SERPL-MCNC: 100 MG/DL (ref 65–140)
GLUCOSE SERPL-MCNC: 109 MG/DL (ref 65–140)
GLUCOSE SERPL-MCNC: 109 MG/DL (ref 65–140)
GLUCOSE SERPL-MCNC: 110 MG/DL (ref 65–140)
GLUCOSE SERPL-MCNC: 118 MG/DL (ref 65–140)
GLUCOSE SERPL-MCNC: 118 MG/DL (ref 65–140)
GLUCOSE SERPL-MCNC: 120 MG/DL (ref 65–140)
GLUCOSE SERPL-MCNC: 120 MG/DL (ref 65–140)
GLUCOSE SERPL-MCNC: 122 MG/DL (ref 65–140)
GLUCOSE SERPL-MCNC: 123 MG/DL (ref 65–140)
GLUCOSE SERPL-MCNC: 146 MG/DL (ref 65–140)
GLUCOSE SERPL-MCNC: 37 MG/DL (ref 65–140)
GLUCOSE SERPL-MCNC: 57 MG/DL (ref 65–140)
GLUCOSE SERPL-MCNC: 62 MG/DL (ref 65–140)
GLUCOSE SERPL-MCNC: 67 MG/DL (ref 65–140)
GLUCOSE SERPL-MCNC: 73 MG/DL (ref 65–140)
GLUCOSE SERPL-MCNC: 82 MG/DL (ref 65–140)
GLUCOSE SERPL-MCNC: 82 MG/DL (ref 65–140)
GLUCOSE SERPL-MCNC: 83 MG/DL (ref 65–140)
GLUCOSE SERPL-MCNC: 85 MG/DL (ref 65–140)
GLUCOSE SERPL-MCNC: 86 MG/DL (ref 65–140)
GLUCOSE SERPL-MCNC: 92 MG/DL (ref 65–140)
GLUCOSE SERPL-MCNC: 93 MG/DL (ref 65–140)
GLUCOSE SERPL-MCNC: 96 MG/DL (ref 65–140)
HBA1C MFR BLD: 5.5 %
HCT VFR BLD AUTO: 40.8 % (ref 36.5–49.3)
HCT VFR BLD AUTO: 42.8 % (ref 36.5–49.3)
HGB BLD-MCNC: 13.6 G/DL (ref 12–17)
HGB BLD-MCNC: 14.3 G/DL (ref 12–17)
IMM GRANULOCYTES # BLD AUTO: 0.02 THOUSAND/UL (ref 0–0.2)
IMM GRANULOCYTES # BLD AUTO: 0.02 THOUSAND/UL (ref 0–0.2)
IMM GRANULOCYTES NFR BLD AUTO: 0 % (ref 0–2)
IMM GRANULOCYTES NFR BLD AUTO: 0 % (ref 0–2)
LACTATE SERPL-SCNC: 1.2 MMOL/L (ref 0.5–2)
LYMPHOCYTES # BLD AUTO: 0.56 THOUSANDS/ΜL (ref 0.6–4.47)
LYMPHOCYTES # BLD AUTO: 0.78 THOUSANDS/ΜL (ref 0.6–4.47)
LYMPHOCYTES NFR BLD AUTO: 12 % (ref 14–44)
LYMPHOCYTES NFR BLD AUTO: 9 % (ref 14–44)
MAGNESIUM SERPL-MCNC: 2.1 MG/DL (ref 1.6–2.6)
MCH RBC QN AUTO: 30.2 PG (ref 26.8–34.3)
MCH RBC QN AUTO: 31.4 PG (ref 26.8–34.3)
MCHC RBC AUTO-ENTMCNC: 33.3 G/DL (ref 31.4–37.4)
MCHC RBC AUTO-ENTMCNC: 33.4 G/DL (ref 31.4–37.4)
MCV RBC AUTO: 91 FL (ref 82–98)
MCV RBC AUTO: 94 FL (ref 82–98)
MONOCYTES # BLD AUTO: 0.6 THOUSAND/ΜL (ref 0.17–1.22)
MONOCYTES # BLD AUTO: 0.67 THOUSAND/ΜL (ref 0.17–1.22)
MONOCYTES NFR BLD AUTO: 10 % (ref 4–12)
MONOCYTES NFR BLD AUTO: 9 % (ref 4–12)
NEUTROPHILS # BLD AUTO: 4.99 THOUSANDS/ΜL (ref 1.85–7.62)
NEUTROPHILS # BLD AUTO: 5.29 THOUSANDS/ΜL (ref 1.85–7.62)
NEUTS SEG NFR BLD AUTO: 77 % (ref 43–75)
NEUTS SEG NFR BLD AUTO: 81 % (ref 43–75)
NRBC BLD AUTO-RTO: 0 /100 WBCS
NRBC BLD AUTO-RTO: 0 /100 WBCS
NT-PROBNP SERPL-MCNC: 157 PG/ML
P AXIS: 16 DEGREES
PLATELET # BLD AUTO: 219 THOUSANDS/UL (ref 149–390)
PLATELET # BLD AUTO: 227 THOUSANDS/UL (ref 149–390)
PMV BLD AUTO: 8.9 FL (ref 8.9–12.7)
PMV BLD AUTO: 8.9 FL (ref 8.9–12.7)
POTASSIUM SERPL-SCNC: 4.1 MMOL/L (ref 3.5–5.3)
POTASSIUM SERPL-SCNC: 4.2 MMOL/L (ref 3.5–5.3)
PR INTERVAL: 212 MS
PROCALCITONIN SERPL-MCNC: 0.1 NG/ML
PROT SERPL-MCNC: 6.9 G/DL (ref 6.4–8.4)
QRS AXIS: -56 DEGREES
QRSD INTERVAL: 82 MS
QT INTERVAL: 380 MS
QTC INTERVAL: 452 MS
RBC # BLD AUTO: 4.51 MILLION/UL (ref 3.88–5.62)
RBC # BLD AUTO: 4.55 MILLION/UL (ref 3.88–5.62)
SARS-COV-2 RNA RESP QL NAA+PROBE: NEGATIVE
SODIUM SERPL-SCNC: 136 MMOL/L (ref 135–147)
SODIUM SERPL-SCNC: 140 MMOL/L (ref 135–147)
T WAVE AXIS: 9 DEGREES
T4 FREE SERPL-MCNC: 1.28 NG/DL (ref 0.76–1.46)
TSH SERPL DL<=0.05 MIU/L-ACNC: 0.39 UIU/ML (ref 0.45–4.5)
VENTRICULAR RATE: 85 BPM
WBC # BLD AUTO: 6.52 THOUSAND/UL (ref 4.31–10.16)
WBC # BLD AUTO: 6.52 THOUSAND/UL (ref 4.31–10.16)

## 2022-07-26 PROCEDURE — 99222 1ST HOSP IP/OBS MODERATE 55: CPT | Performed by: INTERNAL MEDICINE

## 2022-07-26 PROCEDURE — 99223 1ST HOSP IP/OBS HIGH 75: CPT | Performed by: INTERNAL MEDICINE

## 2022-07-26 PROCEDURE — 80377 DRUG/SUBSTANCE NOS 7/MORE: CPT | Performed by: INTERNAL MEDICINE

## 2022-07-26 PROCEDURE — 87081 CULTURE SCREEN ONLY: CPT | Performed by: INTERNAL MEDICINE

## 2022-07-26 PROCEDURE — 84484 ASSAY OF TROPONIN QUANT: CPT | Performed by: EMERGENCY MEDICINE

## 2022-07-26 PROCEDURE — 85025 COMPLETE CBC W/AUTO DIFF WBC: CPT | Performed by: INTERNAL MEDICINE

## 2022-07-26 PROCEDURE — 36415 COLL VENOUS BLD VENIPUNCTURE: CPT | Performed by: EMERGENCY MEDICINE

## 2022-07-26 PROCEDURE — 87040 BLOOD CULTURE FOR BACTERIA: CPT | Performed by: EMERGENCY MEDICINE

## 2022-07-26 PROCEDURE — 82948 REAGENT STRIP/BLOOD GLUCOSE: CPT

## 2022-07-26 PROCEDURE — 83605 ASSAY OF LACTIC ACID: CPT | Performed by: EMERGENCY MEDICINE

## 2022-07-26 PROCEDURE — 84145 PROCALCITONIN (PCT): CPT | Performed by: EMERGENCY MEDICINE

## 2022-07-26 PROCEDURE — 97116 GAIT TRAINING THERAPY: CPT

## 2022-07-26 PROCEDURE — G1004 CDSM NDSC: HCPCS

## 2022-07-26 PROCEDURE — 80048 BASIC METABOLIC PNL TOTAL CA: CPT | Performed by: INTERNAL MEDICINE

## 2022-07-26 PROCEDURE — 93010 ELECTROCARDIOGRAM REPORT: CPT | Performed by: INTERNAL MEDICINE

## 2022-07-26 PROCEDURE — 97166 OT EVAL MOD COMPLEX 45 MIN: CPT

## 2022-07-26 PROCEDURE — 70450 CT HEAD/BRAIN W/O DYE: CPT

## 2022-07-26 PROCEDURE — 96360 HYDRATION IV INFUSION INIT: CPT

## 2022-07-26 PROCEDURE — 97163 PT EVAL HIGH COMPLEX 45 MIN: CPT

## 2022-07-26 RX ORDER — SIMVASTATIN 20 MG
20 TABLET ORAL EVERY EVENING
COMMUNITY

## 2022-07-26 RX ORDER — LORATADINE 10 MG/1
10 TABLET ORAL DAILY
COMMUNITY

## 2022-07-26 RX ORDER — TAMSULOSIN HYDROCHLORIDE 0.4 MG/1
0.4 CAPSULE ORAL
Status: DISCONTINUED | OUTPATIENT
Start: 2022-07-26 | End: 2022-07-28 | Stop reason: HOSPADM

## 2022-07-26 RX ORDER — ALLOPURINOL 300 MG/1
150 TABLET ORAL
COMMUNITY
Start: 2022-07-07

## 2022-07-26 RX ORDER — ASPIRIN 81 MG/1
81 TABLET ORAL DAILY
COMMUNITY
End: 2022-07-26

## 2022-07-26 RX ORDER — AMLODIPINE BESYLATE 5 MG/1
10 TABLET ORAL DAILY
Status: DISCONTINUED | OUTPATIENT
Start: 2022-07-26 | End: 2022-07-28 | Stop reason: HOSPADM

## 2022-07-26 RX ORDER — DEXTROSE AND SODIUM CHLORIDE 5; .9 G/100ML; G/100ML
125 INJECTION, SOLUTION INTRAVENOUS CONTINUOUS
Status: DISCONTINUED | OUTPATIENT
Start: 2022-07-26 | End: 2022-07-27

## 2022-07-26 RX ORDER — HEPARIN SODIUM 5000 [USP'U]/ML
5000 INJECTION, SOLUTION INTRAVENOUS; SUBCUTANEOUS EVERY 8 HOURS SCHEDULED
Status: DISCONTINUED | OUTPATIENT
Start: 2022-07-26 | End: 2022-07-28 | Stop reason: HOSPADM

## 2022-07-26 RX ORDER — DEXTROSE MONOHYDRATE 25 G/50ML
25 INJECTION, SOLUTION INTRAVENOUS ONCE
Status: COMPLETED | OUTPATIENT
Start: 2022-07-26 | End: 2022-07-26

## 2022-07-26 RX ORDER — TAMSULOSIN HYDROCHLORIDE 0.4 MG/1
0.4 CAPSULE ORAL
COMMUNITY

## 2022-07-26 RX ORDER — FOLIC ACID 1 MG/1
1 TABLET ORAL DAILY
Status: DISCONTINUED | OUTPATIENT
Start: 2022-07-26 | End: 2022-07-28 | Stop reason: HOSPADM

## 2022-07-26 RX ORDER — ALLOPURINOL 100 MG/1
150 TABLET ORAL
Status: DISCONTINUED | OUTPATIENT
Start: 2022-07-26 | End: 2022-07-28 | Stop reason: HOSPADM

## 2022-07-26 RX ORDER — PRAVASTATIN SODIUM 40 MG
40 TABLET ORAL
Status: DISCONTINUED | OUTPATIENT
Start: 2022-07-26 | End: 2022-07-28 | Stop reason: HOSPADM

## 2022-07-26 RX ORDER — DEXTROSE AND SODIUM CHLORIDE 5; .9 G/100ML; G/100ML
75 INJECTION, SOLUTION INTRAVENOUS CONTINUOUS
Status: DISCONTINUED | OUTPATIENT
Start: 2022-07-26 | End: 2022-07-26

## 2022-07-26 RX ORDER — AMLODIPINE BESYLATE 10 MG/1
10 TABLET ORAL DAILY
COMMUNITY

## 2022-07-26 RX ORDER — ONDANSETRON 2 MG/ML
4 INJECTION INTRAMUSCULAR; INTRAVENOUS EVERY 6 HOURS PRN
Status: DISCONTINUED | OUTPATIENT
Start: 2022-07-26 | End: 2022-07-28 | Stop reason: HOSPADM

## 2022-07-26 RX ORDER — LANOLIN ALCOHOL/MO/W.PET/CERES
100 CREAM (GRAM) TOPICAL DAILY
Status: DISCONTINUED | OUTPATIENT
Start: 2022-07-26 | End: 2022-07-28 | Stop reason: HOSPADM

## 2022-07-26 RX ORDER — LORATADINE 10 MG/1
10 TABLET ORAL EVERY OTHER DAY
Status: DISCONTINUED | OUTPATIENT
Start: 2022-07-26 | End: 2022-07-28 | Stop reason: HOSPADM

## 2022-07-26 RX ORDER — LISINOPRIL 20 MG/1
20 TABLET ORAL
COMMUNITY
Start: 2022-07-07

## 2022-07-26 RX ORDER — ACETAMINOPHEN 325 MG/1
650 TABLET ORAL EVERY 6 HOURS PRN
Status: DISCONTINUED | OUTPATIENT
Start: 2022-07-26 | End: 2022-07-28 | Stop reason: HOSPADM

## 2022-07-26 RX ADMIN — DEXTROSE AND SODIUM CHLORIDE 125 ML/HR: 5; .9 INJECTION, SOLUTION INTRAVENOUS at 17:22

## 2022-07-26 RX ADMIN — FOLIC ACID 1 MG: 1 TABLET ORAL at 09:01

## 2022-07-26 RX ADMIN — HEPARIN SODIUM 5000 UNITS: 5000 INJECTION INTRAVENOUS; SUBCUTANEOUS at 21:06

## 2022-07-26 RX ADMIN — Medication 1 TABLET: at 08:58

## 2022-07-26 RX ADMIN — DEXTROSE AND SODIUM CHLORIDE 75 ML/HR: 5; .9 INJECTION, SOLUTION INTRAVENOUS at 01:48

## 2022-07-26 RX ADMIN — HEPARIN SODIUM 5000 UNITS: 5000 INJECTION INTRAVENOUS; SUBCUTANEOUS at 14:16

## 2022-07-26 RX ADMIN — Medication 100 MG: at 08:59

## 2022-07-26 RX ADMIN — AMLODIPINE BESYLATE 10 MG: 5 TABLET ORAL at 08:59

## 2022-07-26 RX ADMIN — DEXTROSE MONOHYDRATE 25 ML: 25 INJECTION, SOLUTION INTRAVENOUS at 02:41

## 2022-07-26 RX ADMIN — DEXTROSE MONOHYDRATE 25 ML: 25 INJECTION, SOLUTION INTRAVENOUS at 03:24

## 2022-07-26 RX ADMIN — ALLOPURINOL 150 MG: 100 TABLET ORAL at 21:03

## 2022-07-26 RX ADMIN — DEXTROSE AND SODIUM CHLORIDE 125 ML/HR: 5; .9 INJECTION, SOLUTION INTRAVENOUS at 09:05

## 2022-07-26 RX ADMIN — SODIUM CHLORIDE 500 ML: 0.9 INJECTION, SOLUTION INTRAVENOUS at 01:35

## 2022-07-26 RX ADMIN — TAMSULOSIN HYDROCHLORIDE 0.4 MG: 0.4 CAPSULE ORAL at 16:33

## 2022-07-26 RX ADMIN — SODIUM CHLORIDE 100 ML/HR: 234 INJECTION, SOLUTION INTRAVENOUS at 02:57

## 2022-07-26 RX ADMIN — PRAVASTATIN SODIUM 40 MG: 40 TABLET ORAL at 16:33

## 2022-07-26 RX ADMIN — LORATADINE 10 MG: 10 TABLET ORAL at 09:01

## 2022-07-26 RX ADMIN — HEPARIN SODIUM 5000 UNITS: 5000 INJECTION INTRAVENOUS; SUBCUTANEOUS at 06:10

## 2022-07-26 NOTE — CASE MANAGEMENT
Case Management Assessment & Discharge Planning Note    Patient name Tyron Hernandez  Location /-27 MRN [id number]  : 1925 Date 2022       Current Admission Date: 2022  Current Admission Diagnosis:Hypoglycemia   Patient Active Problem List    Diagnosis Date Noted    Hypoglycemia 2022    AMS (altered mental status) 2022    Acute kidney injury superimposed on chronic kidney disease ([facility] [address] ) 2022    Essential hypertension 2022    Hyperlipidemia 2022    BPH (benign prostatic hyperplasia) 2022    SIRS (systemic inflammatory response syndrome) ([facility] [address] ) 2022    Alcohol abuse 2022      LOS (days): 0  Geometric Mean LOS (GMLOS) (days): 3 30  Days to GMLOS:3     OBJECTIVE:    Risk of Unplanned Readmission Score: 12 85     Current admission status: Inpatient    Preferred Pharmacy:   [address] [facility], [facility] [place] [place] [address],[address] [id number]  Phone: [phone] Fax: [address], [address] [address]  Phone: [phone] Fax: [phone]    Primary Care Provider: Isabel Gomez MD    Primary Insurance: MEDICARE  Secondary Insurance: [id number] [facility] [place] [place]    ASSESSMENT:  Active Health Care Proxies    There are no active Health Care Proxies on file         Advance Directives  Does patient have a Health Care POA?: Yes  Does patient have Advance Directives?: Yes  Advance Directives: Living will, Power of  for health care  Primary Contact: Gabi Salomon: dtr: [phone]    Readmission Root Cause  30 Day Readmission: No    Patient Information  Admitted from[de-identified] Facility ([facility])  Mental Status: Alert  During Assessment patient was accompanied by: Not accompanied during assessment  Assessment information provided by[de-identified] Daughter (Pt unavailable at time of visit)  Primary Caregiver: Self ([facility])  Support Systems: Spouse/significant other, Children, Family members  [place] of Residence: [address] [facility] do you live in?: [address] [facility] entry access options  Select all that apply : No steps to enter home  Type of Current Residence: Other (Comment) ([facility] assisted living)  In the last 12 months, was there a time when you were not able to pay the mortgage or rent on time?: No  In the last 12 months, how many places have you lived?: 1  In the last 12 months, was there a time when you did not have a steady place to sleep or slept in a shelter (including now)?: No  Homeless/housing insecurity resource given?: N/A  Living Arrangements: Lives w/ Spouse/significant other    Activities of Daily Living Prior to Admission  Functional Status: Independent  Completes ADLs independently?: Yes  Ambulates independently?: No  Level of ambulatory dependence: Assistance (uses wheelchair recently, able to ambulate with walker short distances)  Does patient use assisted devices?: Yes  Assisted Devices (DME) used:  Wheelchair, [employer]  Does patient currently own DME?: Yes  What DME does the patient currently own?: [place] [employer], Wheelchair  Does patient have a history of Outpatient Therapy (PT/OT)?: No  Does the patient have a history of Short-Term Rehab?: No  Does patient have a history of HHC?: No  Does patient currently have [facility]?: Yes (through [facility])    Patient Information Continued  Income Source: Pension/detention  Does patient have prescription coverage?: Yes  Within the past 12 months, you worried that your food would run out before you got the money to buy more : Never true  Within the past 12 months, the food you bought just didn't last and you didn't have money to get more : Never true  Food insecurity resource given?: N/A  Does patient receive dialysis treatments?: No  Does patient have a history of substance abuse?: Yes  Historical substance use preference: Alcohol/ETOH  Does patient have a history of Mental Health Diagnosis?: No    DISCHARGE DETAILS:    Discharge planning discussed with[de-identified] patient's dtr: Varsha Gross of Choice: Yes  Comments - Freedom of Choice: Pt's dtr requesting return to [facility] if possible  CM contacted family/caregiver?: Yes    Contacts  Patient Contacts: Love Paulino: dtr: [phone]  Relationship to Patient[de-identified] Family  Contact Method: Phone  Phone Number: [phone]  Reason/Outcome: Emergency Contact    Additional Comments: Attempted to meet with Pt, Pt unavailable at time of visit  Call plaecd to Pt's dtr([place]: [phone]), discussed role of   Pt's dtr reports Pt has been at [facility] with wife since 3/2021  Pt uses wheelchair and ambulates short distances with walker since hurting his foot a few weeks ago  Pt's dtr denies hx of VNA/SNF/MH&DA  Pt's dtr reports Pt gets home PT/OT through [facility]  Pt's dtr reports she is POA and Pt has living will  Pt's dtr's preference is for Pt to return to [facility] upon discharge if possible  Spoke with Urban Leos at [facility], confirmed Pt's uses wheelchair, ambulates short distances with walker and is independent with feeding and adls  CM to follow

## 2022-07-26 NOTE — ASSESSMENT & PLAN NOTE
· EMS called due to AMS  Upon exam found to have blood sugar of 33  Patient with no prior history of diabetes  Received D10 improvement of lead sugar   · Patient now alert oriented x3 in the ER   · CT head negative  · Currently no signs of infection    Not meeting SIRS criteria   · Monitor sugar closely  · Neuro checks   · Appears AMS was most likely due to hypoglycemia event

## 2022-07-26 NOTE — ASSESSMENT & PLAN NOTE
· Meeting SIRS criteria due to tachypnea and tachycardia   · During tachypneic episode patient found to have blood sugar less than 30  · COVID negative, CXR without signs infection  · Lactic, procalcitonin and blood cultures pending  · Continue to monitor without the use of antibiotics at this time

## 2022-07-26 NOTE — OCCUPATIONAL THERAPY NOTE
Occupational Therapy Evaluation     Patient Name: Bernard Cornejo  Today's Date: 7/26/2022  Problem List  Principal Problem:    Hypoglycemia  Active Problems:    AMS (altered mental status)    Acute kidney injury superimposed on chronic kidney disease ([address] )    Essential hypertension    Hyperlipidemia    SIRS (systemic inflammatory response syndrome) (HCC)    Alcohol abuse    Past Medical History  Past Medical History:   Diagnosis Date    CKD (chronic kidney disease) stage 3, GFR 30-59 ml/min (HCC)     Essential (primary) hypertension     Hyperlipidemia     Polyp of colon     Venous insufficiency of lower extremity, unspecified laterality      Past Surgical History  History reviewed  No pertinent surgical history  07/26/22 1410   OT Last Visit   OT Visit Date 07/26/22   Note Type   Note type Evaluation   Pain Assessment   Pain Assessment Tool 0-10   Pain Score No Pain   Home Living   Type of Home Assisted living  ([facility] [place])   Home Layout One level   Prior Function   Level of [place] Independent with ADLs and functional mobility   Lives With Spouse; Facility staff   Receives Help From Personal care attendant   ADL Assistance Independent   IADLs Needs assistance   Falls in the last 6 months 1 to 4   Subjective   Subjective Pt received in supine position  Pt agreeable to session   ADL   Eating Assistance 7  Independent   Eating Deficit Setup   Grooming Assistance 7  Independent   Grooming Deficit Setup   UB Bathing Assistance 5  Supervision/Setup   LB Bathing Assistance 4  Minimal Assistance   UB Dressing Assistance 5  Supervision/Setup   LB Dressing Assistance 4  Minimal Assistance   Toileting Assistance  4  Minimal Assistance   Bed Mobility   Supine to Sit 5  Supervision   Additional Comments Pt remained seated in recliner by end of session   Transfers   Sit to Stand 5  Supervision   Additional items Verbal cues; Impulsive   Stand to Sit 5  Supervision   Additional items Verbal cues   Functional Mobility   Functional Mobility 3  Moderate assistance  (Min-mod Ax 1 with RW  Pt with narrow WOODY )   Balance   Static Sitting Good   Dynamic Sitting Fair +   Static Standing Fair -   Dynamic Standing Poor +   Activity Tolerance   Activity Tolerance Patient tolerated treatment well   Medical Staff Made Aware PT Bushra   Nurse Made Aware GILBERTO GOMES Assessment   RUE Assessment WFL   LUE Assessment   LUE Assessment WFL   Cognition   Overall Cognitive Status Impaired   Arousal/Participation Alert   Attention Attends with cues to redirect   Orientation Level Oriented to person;Disoriented to place; Disoriented to time;Disoriented to situation   Memory Decreased short term memory;Decreased recall of recent events;Decreased recall of precautions   Assessment   Limitation Decreased ADL status; Decreased self-care trans;Decreased high-level ADLs; Decreased cognition   Prognosis Good   Assessment Pt is a 80 y o  male seen for OT evaluation at [facility], admitted 7/25/2022 w/ Hypoglycemia  OT completed expanded review of pt's medical and social history  Comorbidities affecting pt's functional performance at time of assessment include: hyperlipidemia, hypertension, CKD   Personal factors affecting pt at time of IE include:difficulty performing ADLS, difficulty performing IADLS  and decreased functional mobility  Prior to admission, pt was living at [facility] [place]   Pt was I w/  ADLS and required assist with IADLS, & required RW PTA  Upon evaluation: Pt requires sup for bed mobility, sup-mod A for functional mobility/transfers, sup for UB ADLs and min A for LB ADLS 2* the following deficits impacting occupational performance: weakness, decreased balance, impaired memory and impaired problem solving  Full objective findings from OT assessment regarding body systems outlined above  Pt to benefit from continued skilled OT tx while in the hospital to address deficits as defined above and maximize level of functional independence w ADL's and functional mobility  Occupational Performance areas to address include: bathing/shower, toilet hygiene, dressing and functional mobility  Based on findings, pt is of moderate complexity  The patient's raw score on the AM-PAC Daily Activity inpatient short form is 20, standardized score is 42 03, greater than 39 4  Patients at this level are likely to benefit from DC to home, which DOES NOT coincide with CURRENT above OT recommendations  However please refer to therapist recommendation for discharge planning given other factors that may influence destination  At this time, OT recommendations at time of discharge are short term rehab  Pt with increased difficulty with functional mobility  Required mod Ax 1 to maintain safety  OT to follow progress closely  Goals   Patient Goals Pt wishes to get home   Plan   Treatment Interventions ADL retraining;Cognitive reorientation;Patient/family training; Compensatory technique education; Functional transfer training   Goal Expiration Date 08/05/22   OT Treatment Day 0   OT Frequency 2-3x/wk   Recommendation   OT Discharge Recommendation Post acute rehabilitation services   AM-[facility] Daily Activity Inpatient   Lower Body Dressing 2   Bathing 3   Toileting 3   Upper Body Dressing 4   Grooming 4   Eating 4   Daily Activity Raw Score 20   Daily Activity Standardized Score (Calc for Raw Score >=11) 42 03   AM-[facility] Applied Cognition Inpatient   Following a Speech/Presentation 2   Understanding Ordinary Conversation 3   Taking Medications 2   Remembering Where Things Are Placed or Put Away 2   Remembering List of 4-5 Errands 2   Taking Care of Complicated Tasks 2   Applied Cognition Raw Score 13   Applied Cognition Standardized Score 30 46     Pt will achieve the following goals within 10 days      *Pt will complete UB bathing and dressing with mod I     *Pt will complete LB bathing and dressing with mod I      * Pt will complete toileting w/ mod I w/ G hygiene/thoroughness using DME PRN    *Pt will complete bed mobility with mod I, with bed flat and no side rail to prep for purposeful tasks    *Pt will perform functional transfers with on/off all surfaces with mod I using DME as needed w/ G balance/safety  *Pt will increase standing tolerance to 5 minutes in order to complete sinkside ADL task  *Pt will identify 3-5 fall risks during ADL routine to ensure home safety upon discharge  *Pt will improve functional mobility during ADL/IADL/leisure tasks to mod I using DME as needed w/ G balance/safety       Jessica Carl, OTR/L

## 2022-07-26 NOTE — PLAN OF CARE
Problem: PHYSICAL THERAPY ADULT  Goal: Performs mobility at highest level of function for planned discharge setting  See evaluation for individualized goals  Description: Treatment/Interventions: LE strengthening/ROM, Functional transfer training, ADL retraining, Elevations, Therapeutic exercise, Endurance training, Cognitive reorientation, Patient/family training, Equipment eval/education, Bed mobility, Compensatory technique education, Gait training, Continued evaluation, Spoke to nursing, Spoke to case management, OT  Equipment Recommended: Guille Mason       See flowsheet documentation for full assessment, interventions and recommendations  Note: Prognosis: Guarded  Problem List: Decreased endurance, Impaired balance, Decreased mobility, Decreased cognition, Impaired judgement, Decreased safety awareness  Assessment: Pt is a 80 y o  male who presented to ED 7/25/22 with c/o change in mental status  Dx:  BS in 30's  Comorbidities affecting pt's physical performance at time of assessment include: [place], CKD, HTN  Personal factors affecting pt at time of IE include: telemetry, simental, IV  PLOF and home set up listed above; concerns for return home include but are not limited to physical assist, below baseline mobility, unable to care for himself  Upon evaluation: Pt requires S for bed mobility, S for sit to stand, and min-mod A for short distance ambulation with RW  Full objective findings from PT assessment regarding body systems outlined above  Current limitations include impaired balance, decreased endurance/activity tolerance, gait deviations, fall risk and cognition  Pt's clinical presentation is currently unstable/unpredictable seen in pt's presentation of continuous monitoring in ICU, fall risk, and cognition     Pt would benefit from continued PT while in hospital and follow up with inpt rehab at D/C to increase strength, balance, endurance, independence with funcitonal mobility to return to PLOF, maximize independence, decrease caregiver burden and improve quality of life  The patient's AM-PAC Basic Mobility Inpatient Short Form Raw Score is 15  A Raw score of less than or equal to 17 suggests the patient may benefit from discharge to post-acute rehabilitation services  Please also refer to the recommendation of the Physical Therapist for safe discharge planning  Barriers to Discharge: Decreased caregiver support, Inaccessible home environment     PT Discharge Recommendation: Post acute rehabilitation services    See flowsheet documentation for full assessment

## 2022-07-26 NOTE — ED NOTES
Report given to Cecille Littlejohn RN  Plan of care discussed and all questions answered at this time        Chilo Crooks RN  07/26/22 3044

## 2022-07-26 NOTE — ED TRIAGE NOTES
Pt arrives to the ED via EMS from [facility]  EMS reports 911 was called ~ 2230 d/t AMS  Blood sugar for EMS was 33, patient received ~150 mL of D10 PTA  Blood sugar now 128  No PMH of diabetes  Pt returned to baseline after D10 was administered

## 2022-07-26 NOTE — ASSESSMENT & PLAN NOTE
· Patient reports he drinks a bottle of wine a day   · Denies prior history of withdrawal  · CIWA  · Folic acid, thiamine, and multivitamin

## 2022-07-26 NOTE — PLAN OF CARE
Problem: Potential for Falls  Goal: Patient will remain free of falls  Description: INTERVENTIONS:  - Educate patient/family on patient safety including physical limitations  - Instruct patient to call for assistance with activity   - Consult OT/PT to assist with strengthening/mobility   - Keep Call bell within reach  - Keep bed low and locked with side rails adjusted as appropriate  - Keep care items and personal belongings within reach  - Initiate and maintain comfort rounds  - Make Fall Risk Sign visible to staff  - Offer Toileting every 2 Hours, in advance of need  - Initiate/Maintain bed/chair alarm  - Obtain necessary fall risk management equipment  - Apply yellow socks and bracelet for high fall risk patients  - Consider moving patient to room near nurses station  Outcome: Progressing     Problem: MOBILITY - ADULT  Goal: Maintain or return to baseline ADL function  Description: INTERVENTIONS:  -  Assess patient's ability to carry out ADLs; assess patient's baseline for ADL function and identify physical deficits which impact ability to perform ADLs (bathing, care of mouth/teeth, toileting, grooming, dressing, etc )  - Assess/evaluate cause of self-care deficits   - Assess range of motion  - Assess patient's mobility; develop plan if impaired  - Assess patient's need for assistive devices and provide as appropriate  - Encourage maximum independence but intervene and supervise when necessary  - Involve family in performance of ADLs  - Assess for home care needs following discharge   - Consider OT consult to assist with ADL evaluation and planning for discharge  - Provide patient education as appropriate  Outcome: Progressing  Goal: Maintains/Returns to pre admission functional level  Description: INTERVENTIONS:  - Perform BMAT or MOVE assessment daily    - Set and communicate daily mobility goal to care team and patient/family/caregiver     - Collaborate with rehabilitation services on mobility goals if consulted  - Perform Range of Motion 4 times a day  - Reposition patient every 2 hours    - Dangle patient 4 times a day  - Stand patient 4 times a day  - Ambulate patient 4 times a day  - Out of bed to chair 4 times a day   - Out of bed for meals 3 times a day  - Out of bed for toileting  - Record patient progress and toleration of activity level   Outcome: Progressing     Problem: Prexisting or High Potential for Compromised Skin Integrity  Goal: Skin integrity is maintained or improved  Description: INTERVENTIONS:  - Identify patients at risk for skin breakdown  - Assess and monitor skin integrity  - Assess and monitor nutrition and hydration status  - Monitor labs   - Assess for incontinence   - Turn and reposition patient  - Assist with mobility/ambulation  - Relieve pressure over bony prominences  - Avoid friction and shearing  - Provide appropriate hygiene as needed including keeping skin clean and dry  - Evaluate need for skin moisturizer/barrier cream  - Collaborate with interdisciplinary team   - Patient/family teaching  - Consider wound care consult   Outcome: Progressing     Problem: METABOLIC, FLUID AND ELECTROLYTES - ADULT  Goal: Electrolytes maintained within normal limits  Description: INTERVENTIONS:  - Monitor labs and assess patient for signs and symptoms of electrolyte imbalances  - Administer electrolyte replacement as ordered  - Monitor response to electrolyte replacements, including repeat lab results as appropriate  - Instruct patient on fluid and nutrition as appropriate  Outcome: Progressing  Goal: Fluid balance maintained  Description: INTERVENTIONS:  - Monitor labs   - Monitor I/O and WT  - Instruct patient on fluid and nutrition as appropriate  - Assess for signs & symptoms of volume excess or deficit  Outcome: Progressing  Goal: Glucose maintained within target range  Description: INTERVENTIONS:  - Monitor Blood Glucose as ordered  - Assess for signs and symptoms of hyperglycemia and hypoglycemia  - Administer ordered medications to maintain glucose within target range  - Assess nutritional intake and initiate nutrition service referral as needed  Outcome: Progressing

## 2022-07-26 NOTE — ASSESSMENT & PLAN NOTE
Lab Results   Component Value Date    EGFR 24 07/25/2022    EGFR 42 06/30/2022    CREATININE 2 20 (H) 07/25/2022    CREATININE 1 38 (H) 06/30/2022   · Creatinine 2 2 on admission   · Creatinine had been 1 38 about 1 month ago   · Received 500 mL bolus fluids  · Placed on gentle IVF  · Continue to monitor

## 2022-07-26 NOTE — PLAN OF CARE
Problem: Potential for Falls  Goal: Patient will remain free of falls  Description: INTERVENTIONS:  - Educate patient/family on patient safety including physical limitations  - Instruct patient to call for assistance with activity   - Consult OT/PT to assist with strengthening/mobility   - Keep Call bell within reach  - Keep bed low and locked with side rails adjusted as appropriate  - Keep care items and personal belongings within reach  - Initiate and maintain comfort rounds  - Make Fall Risk Sign visible to staff  - Offer Toileting every 2 Hours, in advance of need  - Initiate/Maintain bed alarm  - Obtain necessary fall risk management equipment  - Apply yellow socks and bracelet for high fall risk patients  - Consider moving patient to room near nurses station  Outcome: Progressing     Problem: MOBILITY - ADULT  Goal: Maintain or return to baseline ADL function  Description: INTERVENTIONS:  -  Assess patient's ability to carry out ADLs; assess patient's baseline for ADL function and identify physical deficits which impact ability to perform ADLs (bathing, care of mouth/teeth, toileting, grooming, dressing, etc )  - Assess/evaluate cause of self-care deficits   - Assess range of motion  - Assess patient's mobility; develop plan if impaired  - Assess patient's need for assistive devices and provide as appropriate  - Encourage maximum independence but intervene and supervise when necessary  - Involve family in performance of ADLs  - Assess for home care needs following discharge   - Consider OT consult to assist with ADL evaluation and planning for discharge  - Provide patient education as appropriate  Outcome: Progressing  Goal: Maintains/Returns to pre admission functional level  Description: INTERVENTIONS:  - Perform BMAT or MOVE assessment daily    - Set and communicate daily mobility goal to care team and patient/family/caregiver     - Collaborate with rehabilitation services on mobility goals if consulted  - Perform Range of Motion 4 times a day  - Reposition patient every 2 hours    - Dangle patient 2 times a day  - Stand patient 2 times a day  - Ambulate patient 2 times a day  - Out of bed to chair 2 times a day   - Out of bed for meals 2 times a day  - Out of bed for toileting  - Record patient progress and toleration of activity level   Outcome: Progressing     Problem: Prexisting or High Potential for Compromised Skin Integrity  Goal: Skin integrity is maintained or improved  Description: INTERVENTIONS:  - Identify patients at risk for skin breakdown  - Assess and monitor skin integrity  - Assess and monitor nutrition and hydration status  - Monitor labs   - Assess for incontinence   - Turn and reposition patient  - Assist with mobility/ambulation  - Relieve pressure over bony prominences  - Avoid friction and shearing  - Provide appropriate hygiene as needed including keeping skin clean and dry  - Evaluate need for skin moisturizer/barrier cream  - Collaborate with interdisciplinary team   - Patient/family teaching  - Consider wound care consult   Outcome: Progressing     Problem: METABOLIC, FLUID AND ELECTROLYTES - ADULT  Goal: Electrolytes maintained within normal limits  Description: INTERVENTIONS:  - Monitor labs and assess patient for signs and symptoms of electrolyte imbalances  - Administer electrolyte replacement as ordered  - Monitor response to electrolyte replacements, including repeat lab results as appropriate  - Instruct patient on fluid and nutrition as appropriate  Outcome: Progressing  Goal: Fluid balance maintained  Description: INTERVENTIONS:  - Monitor labs   - Monitor I/O and WT  - Instruct patient on fluid and nutrition as appropriate  - Assess for signs & symptoms of volume excess or deficit  Outcome: Progressing  Goal: Glucose maintained within target range  Description: INTERVENTIONS:  - Monitor Blood Glucose as ordered  - Assess for signs and symptoms of hyperglycemia and hypoglycemia  - Administer ordered medications to maintain glucose within target range  - Assess nutritional intake and initiate nutrition service referral as needed  Outcome: Progressing

## 2022-07-26 NOTE — PLAN OF CARE
Problem: OCCUPATIONAL THERAPY ADULT  Goal: Performs self-care activities at highest level of function for planned discharge setting  See evaluation for individualized goals  Description: Treatment Interventions: ADL retraining, Cognitive reorientation, Patient/family training, Compensatory technique education, Functional transfer training          See flowsheet documentation for full assessment, interventions and recommendations  Note: Limitation: Decreased ADL status, Decreased self-care trans, Decreased high-level ADLs, Decreased cognition  Prognosis: Good  Assessment: Pt is a 80 y o  male seen for OT evaluation at [facility], admitted 7/25/2022 w/ Hypoglycemia  OT completed expanded review of pt's medical and social history  Comorbidities affecting pt's functional performance at time of assessment include: hyperlipidemia, hypertension, CKD   Personal factors affecting pt at time of IE include:difficulty performing ADLS, difficulty performing IADLS  and decreased functional mobility  Prior to admission, pt was living at [facility] [place]   Pt was I w/  ADLS and required assist with IADLS, & required RW PTA  Upon evaluation: Pt requires sup for bed mobility, sup-mod A for functional mobility/transfers, sup for UB ADLs and min A for LB ADLS 2* the following deficits impacting occupational performance: weakness, decreased balance, impaired memory and impaired problem solving  Full objective findings from OT assessment regarding body systems outlined above  Pt to benefit from continued skilled OT tx while in the hospital to address deficits as defined above and maximize level of functional independence w ADL's and functional mobility  Occupational Performance areas to address include: bathing/shower, toilet hygiene, dressing and functional mobility  Based on findings, pt is of moderate complexity   The patient's raw score on the AM-PAC Daily Activity inpatient short form is 20, standardized score is 42 03, greater than 39 4  Patients at this level are likely to benefit from DC to home, which DOES NOT coincide with CURRENT above OT recommendations  However please refer to therapist recommendation for discharge planning given other factors that may influence destination  At this time, OT recommendations at time of discharge are short term rehab  Pt with increased difficulty with functional mobility  Required mod Ax 1 to maintain safety  OT to follow progress closely       OT Discharge Recommendation: Post acute rehabilitation services

## 2022-07-26 NOTE — ASSESSMENT & PLAN NOTE
03/22/19 2339 03/22/19 2342   Provider Notification   Provider Name/Title Dr. Castañeda, G3 Dr. Castañeda, G3   Method of Notification Electronic Page Phone   Request Evaluate - Remote Evaluate - Remote   Notification Reason Other (Comment)  (Can vitals be Q6 hours? Monitoring when?) Other (Comment)  (Vitals Q 6hours, doptones in later AM)   This RN paged Dr. Castañeda, G3, asking if vitals could be assessed every 6 hours with the scheduled indocin doses instead of every 4 hours (to promote less sleep interruption). This RN also asked about an order for FHR monitoring. Dr. Castañeda called this RN and stated that vitals Q6 hours with indocin doses would be okay. She also stated that FHR should be doptoned in the morning but that it could be done later in the morning (not 6 am). She would place orders to reflect these decisions.   · Blood sugar found to be 33 at [facility]   Symptomatic with altered mental status  Received D10 with improvement in blood sugar and cognition   · No prior history of diabetes  Obtain A1c   · Per EMS, facility reported that patient may have received a different patient's hyperglycemic medication  · Placed on D5 initially in the ER but patient again dropped into the 30s    · Monitor blood sugar closely  · D10 IVF

## 2022-07-26 NOTE — ASSESSMENT & PLAN NOTE
· Home regimen amlodipine 10 mg daily lisinopril 20 mg daily   · Hold home lisinopril due to JOANN   · Normotensive thus far, continue monitor

## 2022-07-26 NOTE — ED NOTES
Family at bedside requesting an update  Dr H[facility] at bedside       Carole Bernal RN  07/26/22 0126

## 2022-07-26 NOTE — ED PROVIDER NOTES
History  Chief Complaint   Patient presents with    Hypoglycemia - Symptomatic      59-year-old male with altered mental status, he is a resident of [facility] independent living section baseline alert and oriented x3 found altered, blood sugar in the 30s per EMS, was given D10 and transport currently blood sugar 120  Patient is hard of hearing but is currently at his baseline mental status he does not have any history of diabetes, resides with his wife who also does not have any history of diabetes however there is a possibility that he got medications meant for another resident  Currently denies any complaints, no sick contacts          Prior to Admission Medications   Prescriptions Last Dose Informant Patient Reported? Taking?   allopurinol (ZYLOPRIM) 300 mg tablet   Yes Yes   Sig: Take 150 mg by mouth daily at bedtime   amLODIPine (NORVASC) 10 mg tablet   Yes No   Sig: Take 10 mg by mouth daily   aspirin (ECOTRIN LOW STRENGTH) 81 mg EC tablet   Yes No   Sig: Take 81 mg by mouth daily   lisinopril (ZESTRIL) 20 mg tablet   Yes Yes   Si mg   loratadine (CLARITIN) 10 mg tablet   Yes Yes   Sig: Take 10 mg by mouth daily   naproxen (Naprosyn) 250 mg tablet   No No   Sig: Take 1 tablet (250 mg total) by mouth 2 (two) times a day with meals for 4 days   simvastatin (ZOCOR) 20 mg tablet   Yes No   Sig: Take 20 mg by mouth every evening   tamsulosin (FLOMAX) 0 4 mg   Yes Yes   Sig: Take 0 4 mg by mouth daily with dinner      Facility-Administered Medications: None       Past Medical History:   Diagnosis Date    CKD (chronic kidney disease) stage 3, GFR 30-59 ml/min ([facility])     Essential (primary) hypertension     Hyperlipidemia     Polyp of colon     Venous insufficiency of lower extremity, unspecified laterality        History reviewed  No pertinent surgical history  History reviewed  No pertinent family history  I have reviewed and agree with the history as documented      E-Cigarette/Vaping    E-Cigarette Render If Medication Purchased By Clinic In Visit Note?: Yes Use Never User      E-Cigarette/Vaping Substances     Social History     Tobacco Use    Smoking status: Never Smoker    Smokeless tobacco: Never Used   Vaping Use    Vaping Use: Never used   Substance Use Topics    Drug use: Never       Review of Systems   Constitutional: Negative for appetite change, chills and fever  HENT: Negative for rhinorrhea and sore throat  Eyes: Negative for photophobia and visual disturbance  Respiratory: Negative for cough and shortness of breath  Cardiovascular: Negative for chest pain and palpitations  Gastrointestinal: Negative for abdominal pain and diarrhea  Genitourinary: Negative for dysuria, frequency and urgency  Skin: Negative for rash  Neurological: Negative for dizziness and weakness  All other systems reviewed and are negative  Physical Exam  Physical Exam  Vitals and nursing note reviewed  Constitutional:       Appearance: He is well-developed  HENT:      Head: Normocephalic and atraumatic  Right Ear: External ear normal       Left Ear: External ear normal    Eyes:      Conjunctiva/sclera: Conjunctivae normal       Pupils: Pupils are equal, round, and reactive to light  Neck:      Vascular: No JVD  Trachea: No tracheal deviation  Cardiovascular:      Rate and Rhythm: Normal rate and regular rhythm  Heart sounds: Normal heart sounds  No murmur heard  No friction rub  No gallop  Pulmonary:      Effort: Pulmonary effort is normal  No respiratory distress  Breath sounds: No stridor  No wheezing or rales  Abdominal:      General: There is no distension  Palpations: Abdomen is soft  There is no mass  Tenderness: There is no abdominal tenderness  There is no guarding or rebound  Musculoskeletal:         General: Normal range of motion  Cervical back: Normal range of motion and neck supple  Right lower leg: No edema  Skin:     General: Skin is warm and dry        Capillary Refill: Capillary refill Ndc (300 Mg Prefilled Syringe): [address]-26 takes less than 2 seconds  Coloration: Skin is not pale  Findings: Bruising present  No erythema or rash  Comments: Bruising on bilateral arms, various stages of healing   Neurological:      General: No focal deficit present  Mental Status: He is alert and oriented to person, place, and time  Mental status is at baseline  Cranial Nerves: No cranial nerve deficit     Psychiatric:         Mood and Affect: Mood normal          Vital Signs  ED Triage Vitals   Temperature Pulse Respirations Blood Pressure SpO2   07/26/22 0021 07/25/22 2346 07/25/22 2346 07/25/22 2346 07/25/22 2341   (!) 96 9 °F (36 1 °C) 88 16 136/68 93 %      Temp Source Heart Rate Source Patient Position - Orthostatic VS BP Location FiO2 (%)   07/26/22 0021 07/25/22 2346 07/25/22 2346 07/25/22 2346 --   Temporal Monitor Sitting Left arm       Pain Score       07/25/22 2346       No Pain           Vitals:    07/26/22 0100 07/26/22 0130 07/26/22 0200 07/26/22 0215   BP: 123/70 141/70 143/71    Pulse: 93 102 92 93   Patient Position - Orthostatic VS: Sitting Sitting Sitting          Visual Acuity  Visual Acuity    Flowsheet Row Most Recent Value   L Pupil Size (mm) 3   R Pupil Size (mm) 3          ED Medications  Medications   dextrose 50 % IV solution 25 mL (has no administration in time range)   dextrose 10 % and normal saline infusion (has no administration in time range)   sodium chloride 0 9 % bolus 500 mL (0 mL Intravenous Stopped 7/26/22 0232)       Diagnostic Studies  Results Reviewed     Procedure Component Value Units Date/Time    Fingerstick Glucose (POCT) [[id number]]  (Abnormal) Collected: 07/26/22 0231    Lab Status: Final result Updated: 07/26/22 0232     POC Glucose 37 mg/dl     HS Troponin I 2hr [[id number]]  (Normal) Collected: 07/26/22 0148    Lab Status: Final result Specimen: Blood from Arm, Right Updated: 07/26/22 0229     hs TnI 2hr 15 ng/L      Delta 2hr hsTnI 4 ng/L     Procalcitonin [[id number]]     Lab Status: No result Specimen: Blood     Lactic acid, plasma [[id number]]     Lab Status: No result Specimen: Blood     Blood culture [[id number]]     Lab Status: No result Specimen: Blood     Blood culture [[id number]]     Lab Status: No result Specimen: Blood     Fingerstick Glucose (POCT) [[id number]]  (Normal) Collected: 07/26/22 0133    Lab Status: Final result Updated: 07/26/22 0135     POC Glucose 67 mg/dl     COVID only [[id number]]  (Normal) Collected: 07/25/22 2355    Lab Status: Final result Specimen: Nares from Nose Updated: 07/26/22 0055     SARS-CoV-2 Negative    Narrative:      FOR PEDIATRIC PATIENTS - copy/paste COVID Guidelines URL to browser: [email or web address]  [employer]    SARS-CoV-2 assay is a Nucleic Acid Amplification assay intended for the  qualitative detection of nucleic acid from SARS-CoV-2 in nasopharyngeal  swabs  Results are for the presumptive identification of SARS-CoV-2 RNA  Positive results are indicative of infection with SARS-CoV-2, the virus  causing COVID-19, but do not rule out bacterial infection or co-infection  with other viruses  Laboratories within the [place] and its  territories are required to report all positive results to the appropriate  public health authorities  Negative results do not preclude SARS-CoV-2  infection and should not be used as the sole basis for treatment or other  patient management decisions  Negative results must be combined with  clinical observations, patient history, and epidemiological information  This test has not been FDA cleared or approved  This test has been authorized by FDA under an Emergency Use Authorization  (EUA)   This test is only authorized for the duration of time the  declaration that circumstances exist justifying the authorization of the  emergency use of an in vitro diagnostic tests for detection of SARS-CoV-2  virus and/or diagnosis of COVID-19 infection under section Syringe Size Used (Required For Enhanced Ndc): 300 mg/2ml prefilled pen 564(b)(1) of  the Act, 21 U  S C  413FIF-8(R)(6), unless the authorization is terminated  or revoked sooner  The test has been validated but independent review by FDA  and CLIA is pending  Test performed using [employer] GeneXpert: This RT-PCR assay targets N2,  a region unique to SARS-CoV-2  A conserved region in the E-gene was chosen  for pan-Sarbecovirus detection which includes SARS-CoV-2     TSH, 3rd generation with Free T4 reflex [[id number]]  (Abnormal) Collected: 07/25/22 2355    Lab Status: Final result Specimen: Blood from Arm, Right Updated: 07/26/22 0034     TSH 3RD GENERATON 0 393 uIU/mL     Narrative:      Patients undergoing fluorescein dye angiography may retain small amounts of fluorescein in the body for 48-72 hours post procedure  Samples containing fluorescein can produce falsely depressed TSH values  If the patient had this procedure,a specimen should be resubmitted post fluorescein clearance  Magnesium [[id number]]  (Normal) Collected: 07/25/22 2355    Lab Status: Final result Specimen: Blood from Arm, Right Updated: 07/26/22 0034     Magnesium 2 1 mg/dL     NT-BNP PRO [[id number]]  (Normal) Collected: 07/25/22 2355    Lab Status: Final result Specimen: Blood from Arm, Right Updated: 07/26/22 0034     NT-proBNP 157 pg/mL     T4, free [[id number]] Collected: 07/25/22 2355    Lab Status:  In process Specimen: Blood from Arm, Right Updated: 07/26/22 0034    HS Troponin 0hr (reflex protocol) [[id number]]  (Normal) Collected: 07/25/22 2355    Lab Status: Final result Specimen: Blood from Arm, Right Updated: 07/26/22 0030     hs TnI 0hr 11 ng/L     HS Troponin I 4hr [[id number]]     Lab Status: No result Specimen: Blood     Comprehensive metabolic panel [[id number]]  (Abnormal) Collected: 07/25/22 2355    Lab Status: Final result Specimen: Blood from Arm, Right Updated: 07/26/22 0024     Sodium 136 mmol/L      Potassium 4 1 mmol/L      Chloride 104 mmol/L      CO2 24 mmol/L      ANION GAP 8 mmol/L Lot # (Optional): [id number] BUN 46 mg/dL      Creatinine 2 20 mg/dL      Glucose 82 mg/dL      Calcium 9 2 mg/dL      Corrected Calcium 9 7 mg/dL      AST 13 U/L      ALT 12 U/L      Alkaline Phosphatase 158 U/L      Total Protein 6 9 g/dL      Albumin 3 4 g/dL      Total Bilirubin 0 50 mg/dL      eGFR 24 ml/min/1 73sq m     Narrative:      National Kidney Disease Foundation guidelines for Chronic Kidney Disease (CKD):     Stage 1 with normal or high GFR (GFR > 90 mL/min/1 73 square meters)    Stage 2 Mild CKD (GFR = 60-89 mL/min/1 73 square meters)    Stage 3A Moderate CKD (GFR = 45-59 mL/min/1 73 square meters)    Stage 3B Moderate CKD (GFR = 30-44 mL/min/1 73 square meters)    Stage 4 Severe CKD (GFR = 15-29 mL/min/1 73 square meters)    Stage 5 End Stage CKD (GFR <15 mL/min/1 73 square meters)  Note: GFR calculation is accurate only with a steady state creatinine    CBC and differential [[id number]]  (Abnormal) Collected: 07/25/22 2355    Lab Status: Final result Specimen: Blood from Arm, Right Updated: 07/26/22 0007     WBC 6 52 Thousand/uL      RBC 4 55 Million/uL      Hemoglobin 14 3 g/dL      Hematocrit 42 8 %      MCV 94 fL      MCH 31 4 pg      MCHC 33 4 g/dL      RDW 13 6 %      MPV 8 9 fL      Platelets 472 Thousands/uL      nRBC 0 /100 WBCs      Neutrophils Relative 81 %      Immat GRANS % 0 %      Lymphocytes Relative 9 %      Monocytes Relative 9 %      Eosinophils Relative 1 %      Basophils Relative 0 %      Neutrophils Absolute 5 29 Thousands/µL      Immature Grans Absolute 0 02 Thousand/uL      Lymphocytes Absolute 0 56 Thousands/µL      Monocytes Absolute 0 60 Thousand/µL      Eosinophils Absolute 0 03 Thousand/µL      Basophils Absolute 0 02 Thousands/µL     Fingerstick Glucose (POCT) [[id number]]  (Normal) Collected: 07/25/22 2343    Lab Status: Final result Updated: 07/25/22 2345     POC Glucose 128 mg/dl                  CT head without contrast   Final Result by Charisse Sharpe MD (07/26 7317)      No acute Consent: The risks of pain and injection site reactions were reviewed with the patient prior to the injection. Hide Non-Enhanced Ndc Variable: No intracranial abnormality  Workstation performed: [id number]         XR chest portable   ED Interpretation by Saritha Valdes DO (07/26 0015)    perihilar opacities right> left                 Procedures  CriticalCare Time  Performed by: Saritha Valdes DO  Authorized by: Saritha Valdes DO     Critical care provider statement:     Critical care time (minutes):  35    Critical care time was exclusive of:  Separately billable procedures and treating other patients and teaching time    Critical care was necessary to treat or prevent imminent or life-threatening deterioration of the following conditions: refractory hypoglycemia  Critical care was time spent personally by me on the following activities:  Blood draw for specimens, obtaining history from patient or surrogate, evaluation of patient's response to treatment, examination of patient, ordering and performing treatments and interventions, ordering and review of laboratory studies, ordering and review of radiographic studies and re-evaluation of patient's condition    I assumed direction of critical care for this patient from another provider in my specialty: no               ED Course  ED Course as of 07/26/22 0238   Tue Jul 26, 2022   0001 Procedure Note: EKG  Date/Time: 07/26/22 12:01 AM   Performed by: Cedric Tomas  Authorized by: Cedric Tomas  Indications / Diagnosis: AMS  ECG reviewed by me, the ED Provider: yes   The EKG demonstrates:  Rhythm: normal sinus  Intervals: normal intervals  Axis: normal axis  QRS/Blocks: normal QRS  ST Changes: No acute ST Changes, no STD/SHAUNA    Flattened t waves diffusely       0015 No cough, no shortness of breath   0237 Repeated blood sugar 37 despite patient being on 75 mL an hour of D5 normal saline for last 45 minutes, will give half an amp of D50 and started on D10 normal saline at 100 an hour patient will be admitted to step-down to for frequent Accu-Cheks                               SBIRT 22yo+    Flowsheet Row 03733 Billing Preferences: 1 Most Recent Value   SBIRT (24 yo +)    In order to provide better care to our patients, we are screening all of our patients for alcohol and drug use  Would it be okay to ask you these screening questions? Yes Filed at: 07/25/2022 2347   Initial Alcohol Screen: US AUDIT-C     1  How often do you have a drink containing alcohol? 0 Filed at: 07/25/2022 2347   2  How many drinks containing alcohol do you have on a typical day you are drinking? 0 Filed at: 07/25/2022 2347   3a  Male UNDER 65: How often do you have five or more drinks on one occasion? 0 Filed at: 07/25/2022 2347   3b  FEMALE Any Age, or MALE 65+: How often do you have 4 or more drinks on one occassion? 0 Filed at: 07/25/2022 2347   Audit-C Score 0 Filed at: 07/25/2022 2347   MOI: How many times in the past year have you    Used an illegal drug or used a prescription medication for non-medical reasons? Never Filed at: 07/25/2022 2347                    MDM  Number of Diagnoses or Management Options  Diagnosis management comments: 42-year-old male with hypoglycemia currently improved, baseline mental status will obtain blood work will admit for further care      Disposition  Final diagnoses:   JOANN (acute kidney injury) ([facility] )   Hypoglycemia   Decreased thyroid stimulating hormone (TSH) level     Time reflects when diagnosis was documented in both MDM as applicable and the Disposition within this note     Time User Action Codes Description Comment    7/26/2022  1:06 AM Swathi Legions Add [N17 9] JOANN (acute kidney injury) ([facility] )     7/26/2022  1:06 AM Swathi Legions Add [E16 2] Hypoglycemia     7/26/2022  1:11 AM Swathi Legions Add [R79 89] Decreased thyroid stimulating hormone (TSH) level       ED Disposition     ED Disposition   Admit    Condition   Stable    Date/Time   Tue Jul 26, 2022  1:07 AM    Comment   Case was discussed with LAUREL and the patient's admission status was agreed to be Admission Status: inpatient status to the service of Dr Dayan Zelaya   Administered By (Optional): Ammon Morgan, KIM, APRN Follow-up Information    None         Patient's Medications   Discharge Prescriptions    No medications on file       No discharge procedures on file      PDMP Review     None          ED Provider  Electronically Signed by           Phyllis Almanzar,   07/26/22 [facility]30 [facility] [place]   07/26/22 0449 Ndc (200 Mg Prefilled Syringe): [address]-74 Detail Level: None Expiration Date (Optional): 10/31/23 Ndc (300 Mg Prefilled Pen): [address]-75 Additional Comments: Sample provided-  [id number] J-Code:  Dupixent Amount: 300 mg

## 2022-07-26 NOTE — H&P
Pk Vivas  H&P- [facility] 2/18/1925, 80 y o  male MRN: [id number]  Unit/Bed#: -01 Encounter: [id number]  Primary Care Provider: Luke Leiva MD   Date and time admitted to hospital: 7/25/2022 11:41 PM    * Hypoglycemia  Assessment & Plan  · Blood sugar found to be 33 at [facility] [place]   Symptomatic with altered mental status  Received D10 with improvement in blood sugar and cognition   · No prior history of diabetes  Obtain A1c   · Per EMS, facility reported that patient may have received a different patient's hyperglycemic medication  · Placed on D5 initially in the ER but patient again dropped into the 30s  · Monitor blood sugar closely  · D10 IVF    Acute kidney injury superimposed on chronic kidney disease [facility])  Assessment & Plan  Lab Results   Component Value Date    EGFR 24 07/25/2022    EGFR 42 06/30/2022    CREATININE 2 20 (H) 07/25/2022    CREATININE 1 38 (H) 06/30/2022   · Creatinine 2 2 on admission   · Creatinine had been 1 38 about 1 month ago   · Received 500 mL bolus fluids  · Placed on gentle IVF  · Continue to monitor    AMS (altered mental status)  Assessment & Plan  · EMS called due to AMS  Upon exam found to have blood sugar of 33  Patient with no prior history of diabetes  Received D10 improvement of lead sugar   · Patient now alert oriented x3 in the ER   · CT head negative  · Currently no signs of infection  Not meeting SIRS criteria   · Monitor sugar closely  · Neuro checks   · Appears AMS was most likely due to hypoglycemia event    Alcohol abuse  Assessment & Plan  · Patient reports he drinks a bottle of wine a day   · Denies prior history of withdrawal  · CIWA  · Folic acid, thiamine, and multivitamin       SIRS (systemic inflammatory response syndrome) (HCC)  Assessment & Plan  · Meeting SIRS criteria due to tachypnea and tachycardia   · During tachypneic episode patient found to have blood sugar less than 30  · COVID negative, CXR without signs infection  · Lactic, procalcitonin and blood cultures pending  · Continue to monitor without the use of antibiotics at this time    Hyperlipidemia  Assessment & Plan  · Continue statin    Essential hypertension  Assessment & Plan  · Home regimen amlodipine 10 mg daily lisinopril 20 mg daily   · Hold home lisinopril due to CHAU   · Normotensive thus far, continue monitor    VTE Pharmacologic Prophylaxis: VTE Score: 3 Moderate Risk (Score 3-4) - Pharmacological DVT Prophylaxis Ordered: heparin  Code Status: Level 3 DNR/DNI  Discussion with family: Updated  (daughter) at bedside  Anticipated Length of Stay: Patient will be admitted on an inpatient basis with an anticipated length of stay of greater than 2 midnights secondary to Hypoglycemia, Chau  Total Time for Visit, including Counseling / Coordination of Care: 60 minutes Greater than 50% of this total time spent on direct patient counseling and coordination of care  Chief Complaint: Low blood sugar    History of Present Illness:  Thiago Velasco is a 80 y o  male with a PMH of hyperlipidemia, hypertension, CKD who [facility] [place] due to [facility]  Patient was at home with his wife when he became altered  EMS was called and patient was found to have bs in the 30s  Patient was given d10 and brought to the hospital  During transit to the hospital patient returned to his baseline cognition of alert and oriented x 3  In the ER patient continued to have hypoglycemic events  No prior history of diabetes  Has not received BS medications in the past  At [address] [facility] patient is given his medications by staff  EMS was told that patient may have possibly been given another patient's medications but their staffing is unsure  Patient denies getting any shots or injections  He does not recall receiving any extra medications  Currently resting comfortably in bed in no distress  Denies recent illness  Denies tobacco abuse  Reports he drinks a bottle of wine a day  Denies prior history of withdrawal      Review of Systems:  Review of Systems   Constitutional: Negative for fatigue and fever  HENT: Negative for sore throat  Respiratory: Negative for cough, chest tightness and shortness of breath  Cardiovascular: Negative for chest pain  Gastrointestinal: Negative for abdominal distention, abdominal pain, diarrhea, nausea and vomiting  Genitourinary: Negative for difficulty urinating  Musculoskeletal: Negative for arthralgias  Neurological: Negative for weakness and headaches  Psychiatric/Behavioral: Negative for agitation and behavioral problems  All other systems reviewed and are negative  Past Medical and Surgical History:   Past Medical History:   Diagnosis Date    CKD (chronic kidney disease) stage 3, GFR 30-59 ml/min ([facility])     Essential (primary) hypertension     Hyperlipidemia     Polyp of colon     Venous insufficiency of lower extremity, unspecified laterality        History reviewed  No pertinent surgical history  Meds/Allergies:  Prior to Admission medications    Medication Sig Start Date End Date Taking?  Authorizing Provider   allopurinol (ZYLOPRIM) 300 mg tablet Take 150 mg by mouth daily at bedtime 7/7/22  Yes Historical Provider, MD   lisinopril (ZESTRIL) 20 mg tablet 20 mg 7/7/22  Yes Historical Provider, MD   loratadine (CLARITIN) 10 mg tablet Take 10 mg by mouth daily   Yes Historical Provider, MD   tamsulosin (FLOMAX) 0 4 mg Take 0 4 mg by mouth daily with dinner   Yes Historical Provider, MD   amLODIPine (NORVASC) 10 mg tablet Take 10 mg by mouth daily    Historical Provider, MD   aspirin (ECOTRIN LOW STRENGTH) 81 mg EC tablet Take 81 mg by mouth daily    Historical Provider, MD   naproxen (Naprosyn) 250 mg tablet Take 1 tablet (250 mg total) by mouth 2 (two) times a day with meals for 4 days 6/30/22 7/4/22  Bryan Brown DO   simvastatin (ZOCOR) 20 mg tablet Take 20 mg by mouth every evening    Historical Provider, MD     I have reveiwed home medications using records provided by [facility]  Allergies: No Known Allergies    Social History:  Marital Status: /Civil Union   Occupation: Retired    Patient Pre-hospital Living Situation: [facility]  Patient Pre-hospital Level of Mobility: walks  Patient Pre-hospital Diet Restrictions: regular  Substance Use History:   Social History     Substance and Sexual Activity   Alcohol Use None     Social History     Tobacco Use   Smoking Status Never Smoker   Smokeless Tobacco Never Used     Social History     Substance and Sexual Activity   Drug Use Never       Family History:  History reviewed  No pertinent family history  Physical Exam:     Vitals:   Blood Pressure: 151/91 (07/26/22 0300)  Pulse: 93 (07/26/22 0300)  Temperature: (!) 96 9 °F (36 1 °C) (07/26/22 0021)  Temp Source: Temporal (07/26/22 0021)  Respirations: 22 (07/26/22 0300)  SpO2: 91 % (07/26/22 0353)    Physical Exam  Vitals and nursing note reviewed  Constitutional:       Appearance: Normal appearance  HENT:      Head: Normocephalic  Ears:      Comments: Hard of hearing  Eyes:      Extraocular Movements: Extraocular movements intact  Pupils: Pupils are equal, round, and reactive to light  Cardiovascular:      Rate and Rhythm: Normal rate and regular rhythm  Heart sounds: No murmur heard  No gallop  Pulmonary:      Effort: No respiratory distress  Breath sounds: Normal breath sounds  No wheezing  Abdominal:      General: Bowel sounds are normal  There is no distension  Tenderness: There is no abdominal tenderness  Musculoskeletal:         General: Normal range of motion  Cervical back: Normal range of motion  Right lower leg: No edema  Left lower leg: No edema  Skin:     General: Skin is warm and dry  Neurological:      General: No focal deficit present        Mental Status: He is alert and oriented to person, place, and time  Mental status is at baseline  Psychiatric:         Mood and Affect: Mood normal          Behavior: Behavior normal          Thought Content: Thought content normal           Additional Data:     Lab Results:  Results from last 7 days   Lab Units 07/25/22  2355   WBC Thousand/uL 6 52   HEMOGLOBIN g/dL 14 3   HEMATOCRIT % 42 8   PLATELETS Thousands/uL 219   NEUTROS PCT % 81*   LYMPHS PCT % 9*   MONOS PCT % 9   EOS PCT % 1     Results from last 7 days   Lab Units 07/25/22  2355   SODIUM mmol/L 136   POTASSIUM mmol/L 4 1   CHLORIDE mmol/L 104   CO2 mmol/L 24   BUN mg/dL 46*   CREATININE mg/dL 2 20*   ANION GAP mmol/L 8   CALCIUM mg/dL 9 2   ALBUMIN g/dL 3 4*   TOTAL BILIRUBIN mg/dL 0 50   ALK PHOS U/L 158*   ALT U/L 12   AST U/L 13   GLUCOSE RANDOM mg/dL 82         Results from last 7 days   Lab Units 07/26/22  0349 07/26/22  0319 07/26/22  0255 07/26/22  0231 07/26/22  0133 07/25/22  2343   POC GLUCOSE mg/dl 86 57* 96 37* 67 128         Results from last 7 days   Lab Units 07/26/22  0257 07/26/22  0238   LACTIC ACID mmol/L  --  1 2   PROCALCITONIN ng/ml 0 10  --        Imaging: Reviewed radiology reports from this admission including: CT head  CT head without contrast   Final Result by Crispin Spain MD (07/26 3218)      No acute intracranial abnormality  Workstation performed: [id number]         XR chest portable   ED Interpretation by Brigid Adams DO (07/26 0015)    perihilar opacities right> left          EKG and Other Studies Reviewed on Admission:   · EKG: Sinus with 1st degree AV block, 85 bpm     ** Please Note: This note has been constructed using a voice recognition system   **

## 2022-07-26 NOTE — PHYSICAL THERAPY NOTE
PHYSICAL THERAPY Evaluation    Performed at least 2 patient identifiers during session:  Patient Active Problem List   Diagnosis    Hypoglycemia    AMS (altered mental status)    Acute kidney injury superimposed on chronic kidney disease ([facility] )    Essential hypertension    Hyperlipidemia    BPH (benign prostatic hyperplasia)    SIRS (systemic inflammatory response syndrome) ([facility] )    Alcohol abuse       Past Medical History:   Diagnosis Date    CKD (chronic kidney disease) stage 3, GFR 30-59 ml/min ([facility])     Essential (primary) hypertension     Hyperlipidemia     Polyp of colon     Venous insufficiency of lower extremity, unspecified laterality        History reviewed  No pertinent surgical history  07/26/22 1349   PT Last Visit   PT Visit Date 07/26/22   Note Type   Note type Evaluation   Pain Assessment   Pain Assessment Tool 0-10   Pain Score No Pain   Home Living   Type of Home Apartment  ([facility])   Home Layout One level   [address]   Prior Function   Level of [place] Independent with ADLs and functional mobility; Needs assistance with ADLs and functional mobility   Lives With Facility staff   Receives Help From Personal care attendant   ADL Assistance Independent   IADLs Needs assistance  (Facility provides cooking/cleaning)   Falls in the last 6 months 1 to 4   General   Family/Caregiver Present No   Cognition   Overall Cognitive Status Impaired   Arousal/Participation Cooperative   Orientation Level Oriented to person;Disoriented to place; Disoriented to time;Disoriented to situation   Memory Decreased short term memory;Decreased recall of recent events;Decreased recall of precautions   RUE Assessment   RUE Assessment WFL   LUE Assessment   LUE Assessment WFL   RLE Assessment   RLE Assessment WFL   LLE Assessment   LLE Assessment WFL   Bed Mobility   Supine to Sit 5  Supervision   Additional Comments Pt remains OOB at end   Transfers   Sit to Stand 5  Supervision   Additional items Impulsive   Stand to Sit 5  Supervision   Additional items Assist x 1   Ambulation/Elevation   Gait pattern Forward Flexion;Narrow WOODY; Decreased foot clearance  (heel-toe tandem steps)   Gait Assistance 3  Moderate assist   Additional items Assist x 1   Assistive Device Rolling walker   Distance 40ft min-mod A for balance and RW management  Pt with decreased awareness of deficits  Balance   Static Sitting Fair   Dynamic Sitting Fair   Static Standing Fair -  (RW)   Dynamic Standing Poor +  (RW)   Ambulatory Poor +  (RW)   Activity Tolerance   Activity Tolerance   (no adverse effects to PT noted)   Medical Staff Made Aware COLIN Mayorga   Nurse Made Aware [facility]   Assessment   Prognosis Guarded   Problem List Decreased endurance; Impaired balance;Decreased mobility; Decreased cognition; Impaired judgement;Decreased safety awareness   Assessment Pt is a 80 y o  male who presented to ED 7/25/22 with c/o change in mental status  Dx:  BS in 30's  Comorbidities affecting pt's physical performance at time of assessment include: [place], CKD, HTN  Personal factors affecting pt at time of IE include: telemetry, simental, IV  PLOF and home set up listed above; concerns for return home include but are not limited to physical assist, below baseline mobility, unable to care for himself  Upon evaluation: Pt requires S for bed mobility, S for sit to stand, and min-mod A for short distance ambulation with RW  Full objective findings from PT assessment regarding body systems outlined above  Current limitations include impaired balance, decreased endurance/activity tolerance, gait deviations, fall risk and cognition  Pt's clinical presentation is currently unstable/unpredictable seen in pt's presentation of continuous monitoring in ICU, fall risk, and cognition     Pt would benefit from continued PT while in hospital and follow up with inpt rehab at D/C to increase strength, balance, endurance, independence with funcitonal mobility to return to PLOF, maximize independence, decrease caregiver burden and improve quality of life  The patient's AM-PAC Basic Mobility Inpatient Short Form Raw Score is 15  A Raw score of less than or equal to 17 suggests the patient may benefit from discharge to post-acute rehabilitation services  Please also refer to the recommendation of the Physical Therapist for safe discharge planning  Barriers to Discharge Decreased caregiver support; Inaccessible home environment   Goals   Patient Goals to go home   STG Expiration Date 08/09/22   Short Term Goal #1 Pt will be able to demo:  mod I sit to/from supine, mod I sit to/from standing with RW, mod I to ambulate 150ft with RW;  [facility]   Plan   Treatment/Interventions LE strengthening/ROM; Functional transfer training;ADL retraining;Elevations; Therapeutic exercise; Endurance training;Cognitive reorientation;Patient/family training;Equipment eval/education; Bed mobility; Compensatory technique education;Gait training;Continued evaluation;Spoke to nursing;Spoke to case management;OT   PT Frequency 3-5x/wk   Recommendation   PT Discharge Recommendation Post acute rehabilitation services   Equipment Recommended [facility] Recommended Wheeled walker   AM-PAC Basic Mobility Inpatient   Turning in Bed Without Bedrails 3   Lying on Back to Sitting on Edge of Flat Bed 3   Moving Bed to Chair 3   Standing Up From Chair 3   Walk in Room 2   Climb 3-5 Stairs 1   Basic Mobility Inpatient Raw Score 15   Basic Mobility Standardized Score 36 97   Highest Level Of Mobility   JH-HLM Goal 4: Move to chair/commode   JH-HLM Achieved 7: Walk 25 feet or more     nAnmarie Schrader, PT      Patient Name: Thalia HOPPER Date: 7/26/2022

## 2022-07-26 NOTE — CASE MANAGEMENT
Case Management Progress Note    Patient name Jose Goff  Location /-34 MRN [id number]  : 1925 Date 2022       LOS (days): 0  Geometric Mean LOS (GMLOS) (days): 3 30  Days to GMLOS:2 7        OBJECTIVE:        Current admission status: Inpatient  Preferred Pharmacy:   [facility], [address] [facility] [place] [address]  [address] [address]  Phone: [phone] Fax: 272 [address], [address] [facility] [address] [facility] [address]  Phone: [phone] Fax: [phone]    Primary Care Provider: Francisco High MD    Primary Insurance: [id number] [facility]  Secondary Insurance: MEDICARE    PROGRESS NOTE:  Call placed to [facility], spoke with Yves Cates informed CM to fax PT/OT notes for DON to review in AM to see if facility can accept Pt back  Faxed PT/OT notes to Jony) to review at [phone]

## 2022-07-26 NOTE — ED NOTES
Per Dr Susan Saini, pt OK to eat  [place] ([facility]) sandwich, cookies, and juice provided to the patient        Ira Mann RN  07/26/22 3122

## 2022-07-27 PROBLEM — G93.40 ACUTE ENCEPHALOPATHY: Status: ACTIVE | Noted: 2022-07-26

## 2022-07-27 LAB
ANION GAP SERPL CALCULATED.3IONS-SCNC: 7 MMOL/L (ref 4–13)
BASOPHILS # BLD AUTO: 0.04 THOUSANDS/ΜL (ref 0–0.1)
BASOPHILS NFR BLD AUTO: 1 % (ref 0–1)
BUN SERPL-MCNC: 21 MG/DL (ref 5–25)
CALCIUM SERPL-MCNC: 9.4 MG/DL (ref 8.3–10.1)
CHLORIDE SERPL-SCNC: 108 MMOL/L (ref 96–108)
CO2 SERPL-SCNC: 23 MMOL/L (ref 21–32)
CORTIS SERPL-MCNC: 14.2 UG/DL
CREAT SERPL-MCNC: 1.12 MG/DL (ref 0.6–1.3)
EOSINOPHIL # BLD AUTO: 0.19 THOUSAND/ΜL (ref 0–0.61)
EOSINOPHIL NFR BLD AUTO: 4 % (ref 0–6)
ERYTHROCYTE [DISTWIDTH] IN BLOOD BY AUTOMATED COUNT: 13.4 % (ref 11.6–15.1)
GFR SERPL CREATININE-BSD FRML MDRD: 54 ML/MIN/1.73SQ M
GLUCOSE SERPL-MCNC: 113 MG/DL (ref 65–140)
GLUCOSE SERPL-MCNC: 113 MG/DL (ref 65–140)
GLUCOSE SERPL-MCNC: 114 MG/DL (ref 65–140)
GLUCOSE SERPL-MCNC: 118 MG/DL (ref 65–140)
GLUCOSE SERPL-MCNC: 119 MG/DL (ref 65–140)
GLUCOSE SERPL-MCNC: 121 MG/DL (ref 65–140)
GLUCOSE SERPL-MCNC: 126 MG/DL (ref 65–140)
GLUCOSE SERPL-MCNC: 127 MG/DL (ref 65–140)
GLUCOSE SERPL-MCNC: 160 MG/DL (ref 65–140)
GLUCOSE SERPL-MCNC: 165 MG/DL (ref 65–140)
HCT VFR BLD AUTO: 42.8 % (ref 36.5–49.3)
HGB BLD-MCNC: 14.1 G/DL (ref 12–17)
IMM GRANULOCYTES # BLD AUTO: 0.02 THOUSAND/UL (ref 0–0.2)
IMM GRANULOCYTES NFR BLD AUTO: 0 % (ref 0–2)
INSULIN SERPL-ACNC: 4.7 MU/L (ref 3–25)
LYMPHOCYTES # BLD AUTO: 1.04 THOUSANDS/ΜL (ref 0.6–4.47)
LYMPHOCYTES NFR BLD AUTO: 19 % (ref 14–44)
MAGNESIUM SERPL-MCNC: 1.7 MG/DL (ref 1.6–2.6)
MCH RBC QN AUTO: 31.3 PG (ref 26.8–34.3)
MCHC RBC AUTO-ENTMCNC: 32.9 G/DL (ref 31.4–37.4)
MCV RBC AUTO: 95 FL (ref 82–98)
MONOCYTES # BLD AUTO: 0.65 THOUSAND/ΜL (ref 0.17–1.22)
MONOCYTES NFR BLD AUTO: 12 % (ref 4–12)
MRSA NOSE QL CULT: NORMAL
NEUTROPHILS # BLD AUTO: 3.51 THOUSANDS/ΜL (ref 1.85–7.62)
NEUTS SEG NFR BLD AUTO: 64 % (ref 43–75)
NRBC BLD AUTO-RTO: 0 /100 WBCS
PLATELET # BLD AUTO: 209 THOUSANDS/UL (ref 149–390)
PMV BLD AUTO: 8.6 FL (ref 8.9–12.7)
POTASSIUM SERPL-SCNC: 4.3 MMOL/L (ref 3.5–5.3)
RBC # BLD AUTO: 4.5 MILLION/UL (ref 3.88–5.62)
SODIUM SERPL-SCNC: 138 MMOL/L (ref 135–147)
WBC # BLD AUTO: 5.45 THOUSAND/UL (ref 4.31–10.16)

## 2022-07-27 PROCEDURE — 83735 ASSAY OF MAGNESIUM: CPT | Performed by: INTERNAL MEDICINE

## 2022-07-27 PROCEDURE — 82533 TOTAL CORTISOL: CPT | Performed by: INTERNAL MEDICINE

## 2022-07-27 PROCEDURE — 82948 REAGENT STRIP/BLOOD GLUCOSE: CPT

## 2022-07-27 PROCEDURE — 84681 ASSAY OF C-PEPTIDE: CPT | Performed by: INTERNAL MEDICINE

## 2022-07-27 PROCEDURE — 83525 ASSAY OF INSULIN: CPT | Performed by: INTERNAL MEDICINE

## 2022-07-27 PROCEDURE — 99232 SBSQ HOSP IP/OBS MODERATE 35: CPT | Performed by: INTERNAL MEDICINE

## 2022-07-27 PROCEDURE — 80048 BASIC METABOLIC PNL TOTAL CA: CPT | Performed by: INTERNAL MEDICINE

## 2022-07-27 PROCEDURE — 85025 COMPLETE CBC W/AUTO DIFF WBC: CPT | Performed by: INTERNAL MEDICINE

## 2022-07-27 RX ORDER — LISINOPRIL 20 MG/1
20 TABLET ORAL DAILY
Status: DISCONTINUED | OUTPATIENT
Start: 2022-07-28 | End: 2022-07-28 | Stop reason: HOSPADM

## 2022-07-27 RX ORDER — HYDRALAZINE HYDROCHLORIDE 20 MG/ML
10 INJECTION INTRAMUSCULAR; INTRAVENOUS EVERY 6 HOURS PRN
Status: DISCONTINUED | OUTPATIENT
Start: 2022-07-27 | End: 2022-07-28 | Stop reason: HOSPADM

## 2022-07-27 RX ADMIN — HEPARIN SODIUM 5000 UNITS: 5000 INJECTION INTRAVENOUS; SUBCUTANEOUS at 21:52

## 2022-07-27 RX ADMIN — DEXTROSE AND SODIUM CHLORIDE 125 ML/HR: 5; .9 INJECTION, SOLUTION INTRAVENOUS at 01:26

## 2022-07-27 RX ADMIN — FOLIC ACID 1 MG: 1 TABLET ORAL at 08:37

## 2022-07-27 RX ADMIN — PRAVASTATIN SODIUM 40 MG: 40 TABLET ORAL at 16:55

## 2022-07-27 RX ADMIN — HEPARIN SODIUM 5000 UNITS: 5000 INJECTION INTRAVENOUS; SUBCUTANEOUS at 14:17

## 2022-07-27 RX ADMIN — TAMSULOSIN HYDROCHLORIDE 0.4 MG: 0.4 CAPSULE ORAL at 16:55

## 2022-07-27 RX ADMIN — Medication 1 TABLET: at 08:39

## 2022-07-27 RX ADMIN — Medication 100 MG: at 08:37

## 2022-07-27 RX ADMIN — HEPARIN SODIUM 5000 UNITS: 5000 INJECTION INTRAVENOUS; SUBCUTANEOUS at 05:22

## 2022-07-27 RX ADMIN — AMLODIPINE BESYLATE 10 MG: 5 TABLET ORAL at 08:36

## 2022-07-27 RX ADMIN — ALLOPURINOL 150 MG: 100 TABLET ORAL at 21:50

## 2022-07-27 NOTE — ASSESSMENT & PLAN NOTE
Lab Results   Component Value Date    EGFR 54 07/27/2022    EGFR 28 07/26/2022    EGFR 24 07/25/2022    CREATININE 1 12 07/27/2022    CREATININE 1 94 (H) 07/26/2022    CREATININE 2 20 (H) 07/25/2022   · Creatinine 2 2 on admission   · Creatinine had been 1 38 about 1 month ago   · Received 500 mL bolus fluids  · Patient creatinine this morning is 1 12  · Back to baseline with IV fluids  · DC IV fluid  · Continue to monitor

## 2022-07-27 NOTE — ASSESSMENT & PLAN NOTE
· EMS called due to AMS  Upon exam found to have blood sugar of 33  Patient with no prior history of diabetes  Received D10 improvement of lead sugar   · Patient now alert oriented x3 in the ER   · Acute toxic metabolic encephalopathy in setting of hypoglycemia POA  · CT head negative  · Currently no signs of infection    Not meeting SIRS criteria   · Monitor sugar closely  · Neuro checks   · [place] is back to base

## 2022-07-27 NOTE — ASSESSMENT & PLAN NOTE
· Home regimen amlodipine 10 mg daily lisinopril 20 mg daily   · Continue on Norvasc  · Will restart lisinopril in a m   If creatinine is stable  · Monitor vitals as per protocol

## 2022-07-27 NOTE — PROGRESS NOTES
[place]  Progress Note - Nunu Arce 2/18/1925, 80 y o  male MRN: [id number]  Unit/Bed#: -01 Encounter: [id number]  Primary Care Provider: Nohelia Martin MD   Date and time admitted to hospital: 7/25/2022 11:41 PM    Alcohol abuse  Assessment & Plan  · Patient reports he drinks a bottle of wine a day   · Denies prior history of withdrawal  · CIWA  · Folic acid, thiamine, and multivitamin  SIRS (systemic inflammatory response syndrome) (HCC)  Assessment & Plan  · Meeting SIRS criteria due to tachypnea and tachycardia   · During tachypneic episode patient found to have blood sugar less than 30  · COVID negative, CXR without signs infection  · Procalcitonin is normal  · Cultures are negative to date  · Continue to monitor without the use of antibiotics at this time    Hyperlipidemia  Assessment & Plan  · Continue statin    Essential hypertension  Assessment & Plan  · Home regimen amlodipine 10 mg daily lisinopril 20 mg daily   · Continue on Norvasc  · Will restart lisinopril in a m  If creatinine is stable  · Monitor vitals as per protocol    Acute kidney injury superimposed on chronic kidney disease [facility])  Assessment & Plan  Lab Results   Component Value Date    EGFR 54 07/27/2022    EGFR 28 07/26/2022    EGFR 24 07/25/2022    CREATININE 1 12 07/27/2022    CREATININE 1 94 (H) 07/26/2022    CREATININE 2 20 (H) 07/25/2022   · Creatinine 2 2 on admission   · Creatinine had been 1 38 about 1 month ago   · Received 500 mL bolus fluids  · Patient creatinine this morning is 1 12  · Back to baseline with IV fluids  · DC IV fluid  · Continue to monitor    Acute encephalopathy  Assessment & Plan  · EMS called due to AMS  Upon exam found to have blood sugar of 33  Patient with no prior history of diabetes    Received D10 improvement of lead sugar   · Patient now alert oriented x3 in the ER   · Acute toxic metabolic encephalopathy in setting of hypoglycemia POA  · CT head negative  · Currently no signs of infection  Not meeting SIRS criteria   · Monitor sugar closely  · Neuro checks   · Mentation is back to base    * Hypoglycemia  Assessment & Plan  · Blood sugar found to be 33 at [facility]   Symptomatic with altered mental status  Received D10 with improvement in blood sugar and cognition   · No prior history of diabetes  · Per EMS, facility reported that patient may have received a different patient's hyperglycemic medication  · Placed on D5 initially in the ER but patient again dropped into the 35s  · Monitor blood sugar closely  · Patient was on D5 normal saline  Will discontinue IV fluids  · Monitor blood sugars closely  · HB A1c is normal  · Endocrinology consult appreciated  · Cortisol, C-peptide and fasting insulin is pending  · Will monitor off D5 fluids and see if blood sugars are stable  · P T /OT recommended skilled nursing rehab        Labs & Imaging: I have personally reviewed pertinent reports  VTE Prophylaxis: in place  Code Status:   Level 3 - DNAR and DNI    Patient Centered Rounds: I have performed bedside rounds with nursing staff today  Discussions with Specialists or Other Care Team Provider: CM    Education and Discussions with Family / Patient:  Daughter Tanmay Chong    Current Length of Stay: 1 day(s)    Current Patient Status: Inpatient   Certification Statement: The patient will continue to require additional inpatient hospital stay due to see my assessment and plan  Subjective:   Patient is seen and examined at bedside  Denies any new complaints  Patient is alert awake oriented x3  At baseline  Afebrile  All other ROS are negative  Objective:    Vitals: Blood pressure 149/100, pulse 97, temperature 97 6 °F (36 4 °C), temperature source Oral, resp  rate 20, height 5' 8" (1 727 m), weight 62 9 kg (138 lb 10 7 oz), SpO2 93 %  ,Body mass index is 21 08 kg/m²    SPO2 RA Rest    Flowsheet Row ED to Hosp-Admission (Current) from 7/25/2022 in [facility] Int[facility] Care Unit   SpO2 93 %   SpO2 Activity At Rest   O2 Device None (Room air)   O2 Flow Rate --        I&O:     Intake/Output Summary (Last 24 hours) at 7/27/2022 0850  Last data filed at 7/27/2022 0843  Gross per 24 hour   Intake 2890 ml   Output 4250 ml   Net -1360 ml       Physical Exam:    General- Alert, sitting comfortably in chair  Not in any acute distress  Neck- Supple, No JVD  CVS- regular, S1 and S2 normal  Chest- Bilateral Air entry, No rhochi, crackles or wheezing present  Abdomen- soft, nontender, not distended, no guarding or rigidity, BS+  Extremities-  No pedal edema, No calf tenderness  Moving all 4 extremities  CNS-   Alert, awake and orientedx3  No focal deficits present  Invasive Devices  Report    Peripheral Intravenous Line  Duration           Peripheral IV 07/25/22 Right Forearm 1 day    Peripheral IV 07/26/22 Left Forearm 1 day          Drain  Duration           External Urinary Catheter Medium 1 day                      Social History  reviewed  History reviewed  No pertinent family history   reviewed    Meds:  Current Facility-Administered Medications   Medication Dose Route Frequency Provider Last Rate Last Admin    acetaminophen (TYLENOL) tablet 650 mg  650 mg Oral Q6H PRN Osmany Pepper PA-C        allopurinol (ZYLOPRIM) tablet 150 mg  150 mg Oral HS Em Aguilar PA-C   150 mg at 07/26/22 2103    amLODIPine (NORVASC) tablet 10 mg  10 mg Oral Daily Em Aguilar PA-C   10 mg at 76/65/62 5964    folic acid (FOLVITE) tablet 1 mg  1 mg Oral Daily Em Aguilar PA-C   1 mg at 07/27/22 8756    heparin (porcine) subcutaneous injection 5,000 Units  5,000 Units Subcutaneous [facility] Em Aguilar PA-C   5,000 Units at 07/27/22 0522    loratadine (CLARITIN) tablet 10 mg  10 mg Oral Every Other Day Osmany Pepper PA-C   10 mg at 07/26/22 0901    multivitamin-minerals (CENTRUM) tablet 1 tablet  1 tablet Oral Daily Osmany Pepper PA-C   1 tablet at 07/27/22 1103    ondansetron (ZOFRAN) injection 4 mg  4 mg Intravenous Q6H PRN Armand Collet, PA-C        pravastatin (PRAVACHOL) tablet 40 mg  40 mg Oral Daily With MICHAEL Guevara   40 mg at 07/26/22 1633    tamsulosin (FLOMAX) capsule 0 4 mg  0 4 mg Oral Daily With Dinner Armand Collet, PA-C   0 4 mg at 07/26/22 1633    thiamine tablet 100 mg  100 mg Oral Daily Armand Collet, PA-C   100 mg at 07/27/22 9647      Medications Prior to Admission   Medication    allopurinol (ZYLOPRIM) 300 mg tablet    lisinopril (ZESTRIL) 20 mg tablet    loratadine (CLARITIN) 10 mg tablet    tamsulosin (FLOMAX) 0 4 mg    amLODIPine (NORVASC) 10 mg tablet    naproxen (Naprosyn) 250 mg tablet    simvastatin (ZOCOR) 20 mg tablet       Labs:  Results from last 7 days   Lab Units 07/27/22  0431 07/26/22  0515 07/25/22  2355   WBC Thousand/uL 5 45 6 52 6 52   HEMOGLOBIN g/dL 14 1 13 6 14 3   HEMATOCRIT % 42 8 40 8 42 8   PLATELETS Thousands/uL 209 227 219   NEUTROS PCT % 64 77* 81*   LYMPHS PCT % 19 12* 9*   MONOS PCT % 12 10 9   EOS PCT % 4 1 1     Results from last 7 days   Lab Units 07/27/22  0431 07/26/22  0515 07/25/22  2355   POTASSIUM mmol/L 4 3 4 2 4 1   CHLORIDE mmol/L 108 105 104   CO2 mmol/L 23 25 24   BUN mg/dL 21 41* 46*   CREATININE mg/dL 1 12 1 94* 2 20*   CALCIUM mg/dL 9 4 9 4 9 2   ALK PHOS U/L  --   --  158*   ALT U/L  --   --  12   AST U/L  --   --  13     No results found for: TROPONINI, CKMB, CKTOTAL      Lab Results   Component Value Date    BLOODCX No Growth at 24 hrs  07/26/2022    BLOODCX No Growth at 24 hrs  07/26/2022         Imaging:  Results for orders placed during the hospital encounter of 07/25/22    XR chest portable    Narrative  CHEST    INDICATION:   Acute Resp Failure  COMPARISON:  6/30/2022    EXAM PERFORMED/VIEWS:  XR CHEST PORTABLE      FINDINGS:  There is aortic knob calcification  Cardiomediastinal silhouette appears unremarkable  There is bibasilar patchy airspace disease      Osseous structures appear within normal limits for patient age  Impression  Bibasilar patchy airspace disease may be on the basis of infection, atelectasis or possibly aspiration  Findings appear new since the prior study  Workstation performed: [id number]    No results found for this or any previous visit  Last 24 Hours Medication List:   Current Facility-Administered Medications   Medication Dose Route Frequency Provider Last Rate    acetaminophen  650 mg Oral Q6H PRN Dotty Spies, PA-C      allopurinol  150 mg Oral HS Dotty Spies, PA-C      amLODIPine  10 mg Oral Daily Dotty Spies, PA-C      folic acid  1 mg Oral Daily Dotty Spies, PA-C      heparin (porcine)  5,000 Units Subcutaneous Q8H [facility] Dotty Spies, PA-C      loratadine  10 mg Oral Every Other Day Dotty Spies, PA-C      multivitamin-minerals  1 tablet Oral Daily Dotty Spies, PA-C      ondansetron  4 mg Intravenous Q6H PRN Dotty Spies, PA-C      pravastatin  40 mg Oral Daily With [place], PA-C      tamsulosin  0 4 mg Oral Daily With Dinner Dotty Spies, PA-C      thiamine  100 mg Oral Daily Dotty Spies, PA-C          Today, Patient Was Seen By: Dafne Orellana MD    ** Please Note: Dictation voice to text software may have been used in the creation of this document   **

## 2022-07-27 NOTE — ASSESSMENT & PLAN NOTE
· Meeting SIRS criteria due to tachypnea and tachycardia   · During tachypneic episode patient found to have blood sugar less than 30  · COVID negative, CXR without signs infection  · Procalcitonin is normal  · Cultures are negative to date  · Continue to monitor without the use of antibiotics at this time

## 2022-07-27 NOTE — ASSESSMENT & PLAN NOTE
· Blood sugar found to be 33 at [facility]   Symptomatic with altered mental status  Received D10 with improvement in blood sugar and cognition   · No prior history of diabetes  · Per EMS, facility reported that patient may have received a different patient's hyperglycemic medication  · Placed on D5 initially in the ER but patient again dropped into the 35s  · Monitor blood sugar closely  · Patient was on D5 normal saline    Will discontinue IV fluids  · Monitor blood sugars closely  · HB A1c is normal  · Endocrinology consult appreciated  · Cortisol, C-peptide and fasting insulin is pending  · Will monitor off D5 fluids and see if blood sugars are stable  · P T /OT recommended skilled nursing rehab

## 2022-07-27 NOTE — CASE MANAGEMENT
Case Management Progress Note    Patient name Bernard Cornejo  Location /-57 MRN [id number]  : 1925 Date 2022       LOS (days): 1  Geometric Mean LOS (GMLOS) (days): 3 30  Days to GMLOS:1 8        OBJECTIVE:    Current admission status: Inpatient  Preferred Pharmacy:   [facility], [address] [place] [address] [place] [address]  [address] [address]  Phone: [phone] Fax: 032 [address], [address] 15 [facility] [address]  Phone: [phone] Fax: [phone]    Primary Care Provider: Rosalino Sims MD    Primary Insurance: [id number]  Secondary Insurance: MEDICARE    PROGRESS NOTE:  Call received from Pt's dtr(Snow: [phone]), informed Pt's dtr of tentative discharge back to [facility] on    Pt's dtr in agreement for Pt to return to [facility]

## 2022-07-27 NOTE — CASE MANAGEMENT
Case Management Progress Note    Patient name Destiney Foreman  Location /-80 MRN [id number]  : 1925 Date 2022       LOS (days): 1  Geometric Mean LOS (GMLOS) (days): 3 30  Days to GMLOS:1 8        OBJECTIVE:        Current admission status: Inpatient  Preferred Pharmacy:   [facility], [facility] [place]  [address] [address]  Phone: [phone] Fax: [facility], [address] [place] [address] [address] [address]  Phone: [phone] Fax: [phone]    Primary Care Provider: Bárbara Montiel MD    Primary Insurance: [id number] [facility]  Secondary Insurance: MEDICARE    PROGRESS NOTE:  Received message from Yolanda Arias at [facility] [place] re: Pt's discharge planning  Received call from Pk Weiner at [facility],  Pt is not service connected and if Pt needed SNF, then the coverage would go through [employer]  Call placed to [address] [place] [address] at [place] reviewed PT/OT notes and Pt can return to assisted living  Dora requested COVID test and call in AM  Call placed to Pt's dtr(Snow: [phone]), left message re: discharge planning  Anticipate return call

## 2022-07-28 VITALS
TEMPERATURE: 97.7 F | RESPIRATION RATE: 21 BRPM | WEIGHT: 128.6 LBS | HEART RATE: 97 BPM | BODY MASS INDEX: 19.49 KG/M2 | DIASTOLIC BLOOD PRESSURE: 79 MMHG | OXYGEN SATURATION: 91 % | HEIGHT: 68 IN | SYSTOLIC BLOOD PRESSURE: 121 MMHG

## 2022-07-28 LAB
ANION GAP SERPL CALCULATED.3IONS-SCNC: 8 MMOL/L (ref 4–13)
BASOPHILS # BLD AUTO: 0.04 THOUSANDS/ΜL (ref 0–0.1)
BASOPHILS NFR BLD AUTO: 1 % (ref 0–1)
BUN SERPL-MCNC: 19 MG/DL (ref 5–25)
C PEPTIDE SERPL-MCNC: 3.3 NG/ML (ref 1.1–4.4)
CALCIUM SERPL-MCNC: 9.5 MG/DL (ref 8.3–10.1)
CHLORIDE SERPL-SCNC: 105 MMOL/L (ref 96–108)
CO2 SERPL-SCNC: 22 MMOL/L (ref 21–32)
CREAT SERPL-MCNC: 1.11 MG/DL (ref 0.6–1.3)
EOSINOPHIL # BLD AUTO: 0.23 THOUSAND/ΜL (ref 0–0.61)
EOSINOPHIL NFR BLD AUTO: 4 % (ref 0–6)
ERYTHROCYTE [DISTWIDTH] IN BLOOD BY AUTOMATED COUNT: 13.2 % (ref 11.6–15.1)
FLUAV RNA RESP QL NAA+PROBE: NEGATIVE
FLUBV RNA RESP QL NAA+PROBE: NEGATIVE
GFR SERPL CREATININE-BSD FRML MDRD: 55 ML/MIN/1.73SQ M
GLUCOSE SERPL-MCNC: 102 MG/DL (ref 65–140)
GLUCOSE SERPL-MCNC: 107 MG/DL (ref 65–140)
GLUCOSE SERPL-MCNC: 113 MG/DL (ref 65–140)
GLUCOSE SERPL-MCNC: 130 MG/DL (ref 65–140)
GLUCOSE SERPL-MCNC: 150 MG/DL (ref 65–140)
HCT VFR BLD AUTO: 38.9 % (ref 36.5–49.3)
HGB BLD-MCNC: 13.8 G/DL (ref 12–17)
IMM GRANULOCYTES # BLD AUTO: 0.03 THOUSAND/UL (ref 0–0.2)
IMM GRANULOCYTES NFR BLD AUTO: 1 % (ref 0–2)
LYMPHOCYTES # BLD AUTO: 1.23 THOUSANDS/ΜL (ref 0.6–4.47)
LYMPHOCYTES NFR BLD AUTO: 24 % (ref 14–44)
MCH RBC QN AUTO: 31.9 PG (ref 26.8–34.3)
MCHC RBC AUTO-ENTMCNC: 35.5 G/DL (ref 31.4–37.4)
MCV RBC AUTO: 90 FL (ref 82–98)
MONOCYTES # BLD AUTO: 0.7 THOUSAND/ΜL (ref 0.17–1.22)
MONOCYTES NFR BLD AUTO: 14 % (ref 4–12)
NEUTROPHILS # BLD AUTO: 2.96 THOUSANDS/ΜL (ref 1.85–7.62)
NEUTS SEG NFR BLD AUTO: 56 % (ref 43–75)
NRBC BLD AUTO-RTO: 0 /100 WBCS
PLATELET # BLD AUTO: 231 THOUSANDS/UL (ref 149–390)
PMV BLD AUTO: 8.8 FL (ref 8.9–12.7)
POTASSIUM SERPL-SCNC: 4.2 MMOL/L (ref 3.5–5.3)
RBC # BLD AUTO: 4.33 MILLION/UL (ref 3.88–5.62)
RSV RNA RESP QL NAA+PROBE: NEGATIVE
SARS-COV-2 RNA RESP QL NAA+PROBE: NEGATIVE
SODIUM SERPL-SCNC: 135 MMOL/L (ref 135–147)
WBC # BLD AUTO: 5.19 THOUSAND/UL (ref 4.31–10.16)

## 2022-07-28 PROCEDURE — 97116 GAIT TRAINING THERAPY: CPT

## 2022-07-28 PROCEDURE — 82948 REAGENT STRIP/BLOOD GLUCOSE: CPT

## 2022-07-28 PROCEDURE — 0241U HB NFCT DS VIR RESP RNA 4 TRGT: CPT | Performed by: HOSPITALIST

## 2022-07-28 PROCEDURE — 97530 THERAPEUTIC ACTIVITIES: CPT

## 2022-07-28 PROCEDURE — 85025 COMPLETE CBC W/AUTO DIFF WBC: CPT | Performed by: INTERNAL MEDICINE

## 2022-07-28 PROCEDURE — 80048 BASIC METABOLIC PNL TOTAL CA: CPT | Performed by: INTERNAL MEDICINE

## 2022-07-28 PROCEDURE — 99239 HOSP IP/OBS DSCHRG MGMT >30: CPT | Performed by: HOSPITALIST

## 2022-07-28 RX ADMIN — Medication 100 MG: at 09:09

## 2022-07-28 RX ADMIN — FOLIC ACID 1 MG: 1 TABLET ORAL at 09:10

## 2022-07-28 RX ADMIN — LISINOPRIL 20 MG: 20 TABLET ORAL at 09:10

## 2022-07-28 RX ADMIN — Medication 1 TABLET: at 09:09

## 2022-07-28 RX ADMIN — HEPARIN SODIUM 5000 UNITS: 5000 INJECTION INTRAVENOUS; SUBCUTANEOUS at 13:44

## 2022-07-28 RX ADMIN — AMLODIPINE BESYLATE 10 MG: 5 TABLET ORAL at 09:09

## 2022-07-28 RX ADMIN — HEPARIN SODIUM 5000 UNITS: 5000 INJECTION INTRAVENOUS; SUBCUTANEOUS at 06:07

## 2022-07-28 RX ADMIN — LORATADINE 10 MG: 10 TABLET ORAL at 09:09

## 2022-07-28 NOTE — DISCHARGE SUMMARY
Alcohol abuse  Assessment & Plan  · Patient reports he drinks a bottle of wine a day   · Denies prior history of withdrawal  · CIWA  · Folic acid, thiamine, and multivitamin       SIRS (systemic inflammatory response syndrome) (HCC)  Assessment & Plan  · Meeting SIRS criteria due to tachypnea and tachycardia   · During tachypneic episode patient found to have blood sugar less than 30  · COVID negative, CXR without signs infection  · Procalcitonin is normal  · Cultures are negative to date  · Continue to monitor without the use of antibiotics at this time     Hyperlipidemia  Assessment & Plan  · Continue statin     Essential hypertension  Assessment & Plan  · Home regimen amlodipine 10 mg daily lisinopril 20 mg daily   · Continue on Norvasc  · restarted lisinopril Monitor vitals as per protocol     Acute kidney injury superimposed on chronic kidney disease ([facility] [address] )  Assessment & Plan      ·   Lab Results ·   Component · Value · Date ·     · EGFR · 55 · 07/28/2022 ·     · EGFR · 54 · 07/27/2022 ·     · EGFR · 28 · 07/26/2022 ·     · CREATININE · 1 11 · 07/28/2022 ·     · CREATININE · 1 12 · 07/27/2022 ·     · CREATININE · 1 94 (H) · 07/26/2022 ·   Creatinine 2 2 on admission   · Creatinine had been 1 38 about 1 month ago   · Received 500 mL bolus fluids  · Patient creatinine this morning is 1 12  · Back to baseline with IV fluids  · DC IV fluid  · Continue to monitor     Acute encephalopathy  Assessment & Plan  · EMS called due to AMS  Upon exam found to have blood sugar of 33  Patient with no prior history of diabetes  Received D10 improvement of lead sugar   · Patient now alert oriented x3 in the ER   · Acute toxic metabolic encephalopathy in setting of hypoglycemia POA  · CT head negative  · Currently no signs of infection    Not meeting SIRS criteria   · Monitor sugar closely  · Neuro checks   · Mentation is back to base     * Hypoglycemia  Assessment & Plan  · Blood sugar found to be 33 at [facility]  Symptomatic with altered mental status  Received D10 with improvement in blood sugar and cognition   · No prior history of diabetes  · Per EMS, facility reported that patient may have received a different patient's hyperglycemic medication  · Placed on D5 initially in the ER but patient again dropped into the 35s  · Monitor blood sugar closely  · Patient was on D5 normal saline  Will discontinue IV fluids  · Monitor blood sugars closely  · HB A1c is normal  · Endocrinology consult appreciated  · Cortisol, insulin levels normal; c peptide, sulfonylurea pending  · Blood sugars stable off dextrose supplementation           Hospital Course:      Jorje Fung is a 80 y o  male patient who originally presented to the hospital on       Admission Orders (From admission, onward)              Ordered          07/26/22 0234   Inpatient Admission  Once              07/26/22 0228   Place in Observation  Once,   Status:  Canceled                        due to      Hypoglycemia  It is likely that the patient may have received another patient's diabetic medications  The hypoglycemia did resolve, the patient received intravenous nitro supplementation  He is at baseline and his blood sugars are now stable without any sort of supplementation  Sulfonylurea levels are pending at the time of discharge as well above tests are pending    Patient has no complaints at the time of discharge     Please see above list of diagnoses and related plan for additional information       Physical Exam:     GEN: No acute distress, comfortable, elderly male  HEEENT: No JVD, PERRLA, no scleral icterus  RESP: Lungs clear to auscultation bilaterally  CV: RRR, +s1/s2   ABD: SOFT NON TENDER, POSITIVE BOWEL SOUNDS, NO DISTENTION  PSYCH: CALM  NEURO:  NO FOCAL DEFICITS  SKIN: NO RASH  EXTREM: NO EDEMA        Condition at Discharge:  good        Discharge instructions/Information to patient and family:   See after visit summary for information provided to patient and family        Provisions for Follow-Up Care:  See after visit summary for information related to follow-up care and any pertinent home health orders        Disposition:  snf     Discharge Statement:  I spent 38 minutes discharging the patient  This time was spent on the day of discharge  I had direct contact with the patient on the day of discharge  Greater than 50% of the total time was spent examining patient, answering all patient questions, arranging and discussing plan of care with patient as well as directly providing post-discharge instructions    Additional time then spent on discharge activities      Discharge Medications:  See after visit summary for reconciled discharge medications provided to patient and family        ** Please Note: This note has been constructed using a voice recognition system **

## 2022-07-28 NOTE — PROGRESS NOTES
[place]  Progress Note - Sabine December 2/18/1925, 80 y o  male MRN: [id number]  Unit/Bed#: -01 Encounter: [id number]  Primary Care Provider: Geraldine Rodas MD   Date and time admitted to hospital: 7/25/2022 11:41 PM    Alcohol abuse  Assessment & Plan  · Patient reports he drinks a bottle of wine a day   · Denies prior history of withdrawal  · CIWA  · Folic acid, thiamine, and multivitamin  SIRS (systemic inflammatory response syndrome) (HCC)  Assessment & Plan  · Meeting SIRS criteria due to tachypnea and tachycardia   · During tachypneic episode patient found to have blood sugar less than 30  · COVID negative, CXR without signs infection  · Procalcitonin is normal  · Cultures are negative to date  · Continue to monitor without the use of antibiotics at this time    Hyperlipidemia  Assessment & Plan  · Continue statin    Essential hypertension  Assessment & Plan  · Home regimen amlodipine 10 mg daily lisinopril 20 mg daily   · Continue on Norvasc  · restarted lisinopril Monitor vitals as per protocol    Acute kidney injury superimposed on chronic kidney disease [facility])  Assessment & Plan  Lab Results   Component Value Date    EGFR 55 07/28/2022    EGFR 54 07/27/2022    EGFR 28 07/26/2022    CREATININE 1 11 07/28/2022    CREATININE 1 12 07/27/2022    CREATININE 1 94 (H) 07/26/2022   · Creatinine 2 2 on admission   · Creatinine had been 1 38 about 1 month ago   · Received 500 mL bolus fluids  · Patient creatinine this morning is 1 12  · Back to baseline with IV fluids  · DC IV fluid  · Continue to monitor    Acute encephalopathy  Assessment & Plan  · EMS called due to AMS  Upon exam found to have blood sugar of 33  Patient with no prior history of diabetes  Received D10 improvement of lead sugar   · Patient now alert oriented x3 in the ER   · Acute toxic metabolic encephalopathy in setting of hypoglycemia POA  · CT head negative  · Currently no signs of infection    Not meeting SIRS criteria   · Monitor sugar closely  · Neuro checks   · Mentation is back to base    * Hypoglycemia  Assessment & Plan  · Blood sugar found to be 33 at [facility]   Symptomatic with altered mental status  Received D10 with improvement in blood sugar and cognition   · No prior history of diabetes  · Per EMS, facility reported that patient may have received a different patient's hyperglycemic medication  · Placed on D5 initially in the ER but patient again dropped into the 35s  · Monitor blood sugar closely  · Patient was on D5 normal saline  Will discontinue IV fluids  · Monitor blood sugars closely  · HB A1c is normal  · Endocrinology consult appreciated  · Cortisol, insulin levels normal; c peptide, sulfonylurea pending  · Blood sugars stable off dextrose supplementation         Hospital Course:     Gali Xie is a 80 y o  male patient who originally presented to the hospital on   Admission Orders (From admission, onward)     Ordered        07/26/22 0234  Inpatient Admission  Once            07/26/22 0228  Place in Observation  Once,   Status:  Canceled                     due to     Hypoglycemia  It is likely that the patient may have received another patient's diabetic medications  The hypoglycemia did resolve, the patient received intravenous nitro supplementation  He is at baseline and his blood sugars are now stable without any sort of supplementation  Sulfonylurea levels are pending at the time of discharge as well above tests are pending  Patient has no complaints at the time of discharge    Please see above list of diagnoses and related plan for additional information       Physical Exam:    GEN: No acute distress, comfortable, elderly male  HEEENT: No JVD, PERRLA, no scleral icterus  RESP: Lungs clear to auscultation bilaterally  CV: RRR, +s1/s2   ABD: SOFT NON TENDER, POSITIVE BOWEL SOUNDS, NO DISTENTION  PSYCH: CALM  NEURO:  NO FOCAL DEFICITS  SKIN: NO RASH  EXTREM: NO EDEMA      Condition at Discharge:  good      Discharge instructions/Information to patient and family:   See after visit summary for information provided to patient and family  Provisions for Follow-Up Care:  See after visit summary for information related to follow-up care and any pertinent home health orders  Disposition:  snf     Discharge Statement:  I spent 38 minutes discharging the patient  This time was spent on the day of discharge  I had direct contact with the patient on the day of discharge  Greater than 50% of the total time was spent examining patient, answering all patient questions, arranging and discussing plan of care with patient as well as directly providing post-discharge instructions  Additional time then spent on discharge activities  Discharge Medications:  See after visit summary for reconciled discharge medications provided to patient and family        ** Please Note: This note has been constructed using a voice recognition system **

## 2022-07-28 NOTE — CASE MANAGEMENT
Case Management Discharge Planning Note    Patient name Augustine Johnson  Location /-01 MRN [id number]  : 1925 Date 2022       Current Admission Date: 2022  Current Admission Diagnosis:Hypoglycemia   Patient Active Problem List    Diagnosis Date Noted    Hypoglycemia 2022    Acute encephalopathy 2022    Acute kidney injury superimposed on chronic kidney disease ([facility] ) 2022    Essential hypertension 2022    Hyperlipidemia 2022    BPH (benign prostatic hyperplasia) 2022    [facility] syndrome) [facility] 2022    Alcohol abuse 2022      LOS (days): 2  Geometric Mean LOS (GMLOS) (days): 3 30  Days to GMLOS:1     OBJECTIVE:  Risk of Unplanned Readmission Score: 11 03     Current admission status: Inpatient   Preferred Pharmacy:   [facility], [address] [place] [address] [address],[place] [address]  Phone: [phone] Fax: [address]  Phone: [phone] Fax: [phone]    Primary Care Provider: Morteza Hernandez MD    Primary Insurance: [id number]  Secondary Insurance: MEDICARE    DISCHARGE DETAILS:    Discharge planning discussed with[de-identified] patient's dtr(Snow)  Freedom of Choice: Yes  Comments - Freedom of Choice: Dtr agreeable for Pt to go to SNF at [facility]  CM contacted family/caregiver?: Yes    Additional Comments: Message received from Efe Oviedo at [facility], requesting Pt to go to [facility] for SNF  Call placed to Pt's dtr(Snow: [phone]), informed Pt's dtr that [facility] at [facility] is requesting Pt to go to [facility] at [facility] to get stronger  Pt's dtr in agreement  Referral sent to [facility] via [address] [place] [address] Road

## 2022-07-28 NOTE — PHYSICAL THERAPY NOTE
PHYSICAL THERAPY NOTE       07/28/22 0934   PT Last Visit   PT Visit Date 07/28/22   Pain Assessment   Pain Assessment Tool 0-10   Pain Score No Pain   Restrictions/Precautions   Weight Bearing Precautions Per Order No   General   Chart Reviewed Yes   Family/Caregiver Present No   Cognition   Overall Cognitive Status Impaired   Arousal/Participation Alert; Cooperative   Attention Within functional limits   Orientation Level Oriented to person   Memory Decreased recall of biographical information;Decreased long term memory;Decreased short term memory;Decreased recall of recent events;Decreased recall of precautions   Following Commands Follows one step commands with increased time or repetition   Subjective   Subjective pt state he needs to go to the bathroom;  "I am leaving today "   Bed Mobility   Supine to Sit 5  Supervision   Additional items Assist x 1; Increased time required   Additional Comments Pt remains seated OOB in chair at end of session   Transfers   Sit to Stand 4  Minimal assistance   Additional items Assist x 1; Increased time required;Verbal cues  (RW)   Stand to Sit 4  Minimal assistance   Additional items Assist x 1; Increased time required   Stand pivot 3  Moderate assistance   Additional items Assist x 1; Increased time required;Verbal cues  (RW)   Toilet transfer 3  Moderate assistance   Additional items Assist x 1; Increased time required;Commode  (RW)   Additional Comments pt urinated while on commode;  pt able to stand and perform hygiene while mod A for balance with RW   Ambulation/Elevation   Gait pattern Forward Flexion;Decreased foot clearance;Narrow WOODY   Gait Assistance 3  Moderate assist   Additional items Assist x 1;Verbal cues   Assistive Device Rolling walker   Distance 15ft min-mod A for balance and RW management;  pt with tandem steps at times   Balance   Static Sitting Good   Dynamic Sitting Fair + Static Standing Fair -  (RW)   Dynamic Standing Poor +  (RW)   Ambulatory Poor +  (RW)   Activity Tolerance   Activity Tolerance Patient tolerated treatment well   Nurse Made Aware Madiha   Assessment   Prognosis Guarded   Problem List Decreased strength;Decreased range of motion;Decreased mobility; Decreased endurance; Impaired balance;Decreased coordination;Decreased cognition; Impaired judgement;Decreased safety awareness   Assessment Treatment consisted of bed mobility,balance training, ambulation training, safety awareness, fall prevention, endurance training to increase upright positions and functional mobility  Pt able to perform hygiene while standing after urinating on commode however requires mod A for balance  Pt able to ambulate short distances, continues to have gait deviations requiring mod A for balance and safety; Pt would benefit from continued PT while in hospital and follow up with inpt rehab at [facility] to increase strength, balance, endurance, mobility independence to return to [facility], decrease caregiver burden and improve quality of life  The patient's AM-[facility] Basic Mobility Inpatient Short Form Raw Score is 15  A Raw score of less than or equal to 17 suggests the patient may benefit from discharge to post-acute rehabilitation services  Please also refer to the recommendation of the Physical Therapist for safe discharge planning  Barriers to Discharge Inaccessible home environment;Decreased caregiver support   Goals   Patient Goals to leave today   STG Expiration Date 08/09/22   Plan   Treatment/Interventions ADL retraining;Functional transfer training;LE strengthening/ROM; Therapeutic exercise; Endurance training;Cognitive reorientation;Patient/family training;Equipment eval/education; Bed mobility;Continued evaluation;Gait training; Compensatory technique education;Spoke to nursing;Spoke to case management   PT Frequency 3-5x/wk   Recommendation   PT Discharge Recommendation Post acute rehabilitation services   Equipment Recommended [facility] Recommended Wheeled walker   AM-PAC Basic Mobility Inpatient   Turning in Bed Without Bedrails 3   Lying on Back to Sitting on Edge of Flat Bed 3   Moving Bed to Chair 3   Standing Up From Chair 3   Walk in Room 2   Climb 3-5 Stairs 1   Basic Mobility Inpatient Raw Score 15   Basic Mobility Standardized Score 36 97   Highest Level Of Mobility   JH-HLM Goal 4: Move to chair/commode   JH-HLM Achieved 6: Walk 10 steps or more     Alisha Yip, PT      Patient Name: Destiney Foreman  QSETF'Y Date: 7/28/2022

## 2022-07-28 NOTE — ASSESSMENT & PLAN NOTE
· Home regimen amlodipine 10 mg daily lisinopril 20 mg daily   · Continue on Norvasc  · restarted lisinopril Monitor vitals as per protocol

## 2022-07-28 NOTE — ASSESSMENT & PLAN NOTE
Lab Results   Component Value Date    EGFR 55 07/28/2022    EGFR 54 07/27/2022    EGFR 28 07/26/2022    CREATININE 1 11 07/28/2022    CREATININE 1 12 07/27/2022    CREATININE 1 94 (H) 07/26/2022   · Creatinine 2 2 on admission   · Creatinine had been 1 38 about 1 month ago   · Received 500 mL bolus fluids  · Patient creatinine this morning is 1 12  · Back to baseline with IV fluids  · DC IV fluid  · Continue to monitor

## 2022-07-28 NOTE — PLAN OF CARE
Problem: PHYSICAL THERAPY ADULT  Goal: Performs mobility at highest level of function for planned discharge setting  See evaluation for individualized goals  Description: Treatment/Interventions: LE strengthening/ROM, Functional transfer training, ADL retraining, Elevations, Therapeutic exercise, Endurance training, Cognitive reorientation, Patient/family training, Equipment eval/education, Bed mobility, Compensatory technique education, Gait training, Continued evaluation, Spoke to nursing, Spoke to case management, OT  Equipment Recommended: Cecy Cobb       See flowsheet documentation for full assessment, interventions and recommendations  Note: Prognosis: Guarded  Problem List: Decreased strength, Decreased range of motion, Decreased mobility, Decreased endurance, Impaired balance, Decreased coordination, Decreased cognition, Impaired judgement, Decreased safety awareness  Assessment: Treatment consisted of bed mobility,balance training, ambulation training, safety awareness, fall prevention, endurance training to increase upright positions and functional mobility  Pt able to perform hygiene while standing after urinating on commode however requires mod A for balance  Pt able to ambulate short distances, continues to have gait deviations requiring mod A for balance and safety; Pt would benefit from continued PT while in hospital and follow up with inpt rehab at [facility] to increase strength, balance, endurance, mobility independence to return to [facility], decrease caregiver burden and improve quality of life  The patient's AM-PAC Basic Mobility Inpatient Short Form Raw Score is 15  A Raw score of less than or equal to 17 suggests the patient may benefit from discharge to post-acute rehabilitation services  Please also refer to the recommendation of the Physical Therapist for safe discharge planning    Barriers to Discharge: Inaccessible home environment, Decreased caregiver support     PT Discharge Recommendation: Post acute rehabilitation services    See flowsheet documentation for full assessment

## 2022-07-28 NOTE — CASE MANAGEMENT
Case Management Discharge Planning Note    Patient name Karma Elder  Location /-82 MRN [id number]  : 1925 Date 2022       Current Admission Date: 2022  Current Admission Diagnosis:Hypoglycemia   Patient Active Problem List    Diagnosis Date Noted    Hypoglycemia 2022    Acute encephalopathy 2022    Acute kidney injury superimposed on chronic kidney disease ([facility] ) 2022    Essential hypertension 2022    Hyperlipidemia 2022    BPH (benign prostatic hyperplasia) 2022    S[facility] syndrome[facility] ) 2022    Alcohol abuse 2022      LOS (days): 2  Geometric Mean LOS (GMLOS) (days): 3 30  Days to GMLOS:0 8     OBJECTIVE:  Risk of Unplanned Readmission Score: 11 06     Current admission status: Inpatient   Preferred Pharmacy:   [facility], [address] [place] [address] [place] [address] [place] [address],[address] [address]  Phone: [phone] Fax: [address], [address] [place] [address] [address] [address]  Phone: [phone] Fax: [phone]    Primary Care Provider: Enrique Bowen MD    Primary Insurance: [facility]  Secondary Insurance: MEDICARE    DISCHARGE DETAILS:    Additional Comments: [facility] ambulance transport to [address] [place] [address] is 3:30pm  Landon Primrose [facility] of [facility] time  Pt's nurse(Madiha) informed of transport time  Call placed to Pt's dtr(Kirti), informed of transport time to [facility]  All in agreement

## 2022-07-28 NOTE — ASSESSMENT & PLAN NOTE
· Blood sugar found to be 33 at [facility]   Symptomatic with altered mental status  Received D10 with improvement in blood sugar and cognition   · No prior history of diabetes  · Per EMS, facility reported that patient may have received a different patient's hyperglycemic medication  · Placed on D5 initially in the ER but patient again dropped into the 35s  · Monitor blood sugar closely  · Patient was on D5 normal saline    Will discontinue IV fluids  · Monitor blood sugars closely  · HB A1c is normal  · Endocrinology consult appreciated  · Cortisol, insulin levels normal; c peptide, sulfonylurea pending  · Blood sugars stable off dextrose supplementation

## 2022-07-31 LAB
BACTERIA BLD CULT: NORMAL
BACTERIA BLD CULT: NORMAL

## 2022-08-04 LAB
ACETOHEXAMIDE SERPL-MCNC: NEGATIVE UG/ML (ref 20–60)
CHLORPROPAMIDE SERPL-MCNC: NEGATIVE UG/ML (ref 75–250)
GLIMEPIRIDE SERPL-MCNC: NEGATIVE NG/ML (ref 80–250)
GLIPIZIDE SERPL-MCNC: NEGATIVE NG/ML (ref 200–1000)
GLYBURIDE SERPL-MCNC: NEGATIVE NG/ML
NATEGLINIDE SERPL-MCNC: NEGATIVE NG/ML
REPAGLINIDE SERPL-MCNC: NEGATIVE NG/ML
TOLAZAMIDE SERPL-MCNC: NEGATIVE UG/ML
TOLBUTAMIDE SERPL-MCNC: NEGATIVE UG/ML (ref 40–100)

## 2022-08-08 PROCEDURE — U0005 INFEC AGEN DETEC AMPLI PROBE: HCPCS | Performed by: INTERNAL MEDICINE

## 2022-08-08 PROCEDURE — U0003 INFECTIOUS AGENT DETECTION BY NUCLEIC ACID (DNA OR RNA); SEVERE ACUTE RESPIRATORY SYNDROME CORONAVIRUS 2 (SARS-COV-2) (CORONAVIRUS DISEASE [COVID-19]), AMPLIFIED PROBE TECHNIQUE, MAKING USE OF HIGH THROUGHPUT TECHNOLOGIES AS DESCRIBED BY CMS-2020-01-R: HCPCS | Performed by: INTERNAL MEDICINE

## 2022-08-09 ENCOUNTER — LAB REQUISITION (OUTPATIENT)
Dept: LAB | Facility: HOSPITAL | Age: 87
End: 2022-08-09
Payer: COMMERCIAL

## 2022-08-09 DIAGNOSIS — Z20.828 CONTACT WITH AND (SUSPECTED) EXPOSURE TO OTHER VIRAL COMMUNICABLE DISEASES: ICD-10-CM

## 2022-08-09 LAB — SARS-COV-2 RNA RESP QL NAA+PROBE: NEGATIVE

## 2022-08-12 ENCOUNTER — HOSPITAL ENCOUNTER (OUTPATIENT)
Dept: NON INVASIVE DIAGNOSTICS | Facility: HOSPITAL | Age: 87
Discharge: HOME | End: 2022-08-12
Payer: MEDICARE

## 2022-08-12 DIAGNOSIS — M79.89 LEG SWELLING: ICD-10-CM

## 2022-08-12 PROCEDURE — 93970 EXTREMITY STUDY: CPT

## 2022-08-13 PROCEDURE — 93970 EXTREMITY STUDY: CPT | Performed by: SURGERY

## 2022-08-18 ENCOUNTER — OFFICE VISIT (OUTPATIENT)
Dept: VASCULAR SURGERY | Facility: CLINIC | Age: 87
End: 2022-08-18
Payer: MEDICARE

## 2022-08-18 DIAGNOSIS — I82.412 ACUTE DEEP VEIN THROMBOSIS (DVT) OF LEFT FEMORAL VEIN (HCC): Primary | ICD-10-CM

## 2022-08-18 PROCEDURE — 99203 OFFICE O/P NEW LOW 30 MIN: CPT | Performed by: NURSE PRACTITIONER

## 2022-08-18 RX ORDER — LISINOPRIL 10 MG/1
10 TABLET ORAL DAILY
COMMUNITY

## 2022-08-18 RX ORDER — ALLOPURINOL 100 MG/1
100 TABLET ORAL DAILY
COMMUNITY

## 2022-08-18 RX ORDER — CHLORAL HYDRATE 500 MG
CAPSULE ORAL
COMMUNITY
Start: 2022-07-28 | End: 2022-08-18 | Stop reason: ALTCHOICE

## 2022-08-18 RX ORDER — APIXABAN 5 MG/1
TABLET, FILM COATED ORAL
COMMUNITY
Start: 2022-08-15

## 2022-08-18 RX ORDER — BISMUTH TRIBROMOPH/PETROLATUM 5"X9"
BANDAGE TOPICAL
COMMUNITY
Start: 2022-08-06

## 2022-08-18 RX ORDER — ACETAMINOPHEN 500 MG
TABLET ORAL
COMMUNITY
Start: 2022-06-30

## 2022-08-18 RX ORDER — FOLIC ACID 1 MG/1
TABLET ORAL
COMMUNITY
Start: 2022-07-28

## 2022-08-18 RX ORDER — RIVAROXABAN 10 MG/1
TABLET, FILM COATED ORAL
COMMUNITY
Start: 2022-08-15 | End: 2022-08-18 | Stop reason: ALTCHOICE

## 2022-08-18 RX ORDER — MULTIVITAMIN WITH FOLIC ACID 400 MCG
TABLET ORAL
COMMUNITY
Start: 2022-07-28

## 2022-08-18 RX ORDER — PRAVASTATIN SODIUM 40 MG
TABLET ORAL
COMMUNITY
Start: 2022-07-28

## 2022-08-18 NOTE — ASSESSMENT & PLAN NOTE
51-year-old male with HTN, HLD, CKD, history of colon CA s/p resection '70's, left lower extremity DVT initially started on Xarelto and since changed to Eliquis therapeutic dosing  Presents for vascular evaluation  -Mild bilateral lower extremity swelling, left leg pain improving   -Appears to be a provoked DVT secondary to sedentary behavior as evidenced by 2 falls and 1 hospitalization   -Recommended continuing Eliquis, therapeutic dosing 10 mg b i d  X 7 days and then 5 mg b i d  -Recommended 6 months of therapy due to extent of blood clot   -Easily palpable 2+ pedal pulses bilaterally   -Tubigrip compression given to start    Rx light compression given for ease of use   -Compression socks to be worn daily and removed at night   -Periodic leg elevation to help manage swelling -Agree with regular ambulation, supervised with PT  -Will repeat venous duplex in 6 months   -Follow up in the office in 6 months

## 2022-08-18 NOTE — PROGRESS NOTES
Assessment/Plan:    Acute deep vein thrombosis (DVT) of left femoral vein ([address] )  80year-old male with HTN, HLD, CKD, history of colon CA s/p resection '70's, left lower extremity DVT initially started on Xarelto and since changed to Eliquis therapeutic dosing  Presents for vascular evaluation  -Mild bilateral lower extremity swelling, left leg pain improving   -Appears to be a provoked DVT secondary to sedentary behavior as evidenced by 2 falls and 1 hospitalization   -Recommended continuing Eliquis, therapeutic dosing 10 mg b i d  X 7 days and then 5 mg b i d  -Recommended 6 months of therapy due to extent of blood clot   -Easily palpable 2+ pedal pulses bilaterally   -Tubigrip compression given to start  Rx light compression given for ease of use   -Compression socks to be worn daily and removed at night   -Periodic leg elevation to help manage swelling -Agree with regular ambulation, supervised with PT  -Will repeat venous duplex in 6 months   -Follow up in the office in 6 months       Diagnoses and all orders for this visit:    Acute deep vein thrombosis (DVT) of left femoral vein (HCC)  -     VAS lower limb venous duplex study, complete bilateral; Future  -     Compression Stocking    Other orders  -     acetaminophen (TYLENOL) 500 mg tablet  -     allopurinol (ZYLOPRIM) 100 mg tablet; Take 100 mg by mouth daily  -     Eliquis 5 MG; TAKE TWO TABLETS BY MOUTH TWICE A DAY FOR 7 DAYS  -     Bismuth Tribromoph-Petrolatum (Xeroform Petrolat Gauze 5"x9") MISC  -     folic acid (FOLVITE) 1 mg tablet  -     lisinopril (ZESTRIL) 10 mg tablet; Take 10 mg by mouth daily  -     miconazole (MICOTIN) 2 % powder  -     Multiple Vitamin (Tab-A-Jeffrey) TABS  -     Discontinue: Omega-3 Fatty Acids (fish oil) 1,000 mg  -     pravastatin (PRAVACHOL) 40 mg tablet  -     Discontinue: Xarelto 10 MG tablet          Subjective:      Patient ID: Jose Goff is a 80 y o  male  Patient is new to our practice   Patient c/o pain at times not often  Patient does not walk at home just chair changing or standing  Patient relies on wheelchair  Patient son states he has had multiples falls and now has a clot on his left leg  Patient does not wears compression stockings  Patient had a LEV done on 8/2/22  Patient is currently taking Simvastatin  HPI  80-year-old male with HTN, HLD, CKD, history of colon CA s/p resection '70's, left lower extremity DVT initially started on Xarelto and since changed to Eliquis therapeutic dosing  Presents for vascular evaluation  Patient currently in a wheelchair  He is accompanied by his son-in-law and daughter on speaker phone  Patient [facility] [place]   Patient had lower extremity venous duplex 8/12/22 that showed left femoral popliteal DVT and presents for vascular evaluation  Patient started on Xarelto initially and then transition to Eliquis, therapeutic dosing by [facility]  Upon examination he has mild bilateral lower extremity swelling, left greater than right  He fell upon standing on June 30th while at [facility]   He had some difficulty with bearing weight on his left leg  He was taken to [address] [place] [address] Drive facility and evaluated with x-ray with no evidence of fracture  He has since been using a wheelchair  He fell/twisted his ankle again 3 days later from the recliner getting into the wheelchair  He developed a large bruise and swelling which is since improved  He was hospitalized 7/25 to 7/28 at [address] [place] [address] secondary to change in mental status, low blood sugar in the 30s  On August 12 he was evaluated with venous duplex due to left leg swelling which showed acute versus subacute nonocclusive thrombus in the common femoral vein, proximal vein, mid and distal femoral vein, popliteal vein and paired peroneal veins and 1 of the paired posterior tibial veins on the left lower extremity  Right lower extremity was negative for DVT/SVT  Patient denies any prior occurrence of DVT  Venous insufficiency as prior diagnosis in epic EMR  He previously did not wear any compression socks  He is now doing limited walking with physical therapy at facility  He has improvement in left lower extremity pain  He does have history of colon cancer and is status post resection in the 1970s  Last colonoscopy was 10 years ago  He is a nonsmoker  He does enjoy drinking wine and measuring up to a bottle a night  He is no longer drinking wine since recent hospitalization and starting anticoagulation  The following portions of the patient's history were reviewed and updated as appropriate: allergies, current medications, past family history, past medical history, past social history, past surgical history and problem list   ROS reviewed     Review of Systems   Constitutional: Negative  HENT: Negative  Eyes: Negative  Respiratory: Negative  Cardiovascular: Negative  Gastrointestinal: Negative  Endocrine: Negative  Genitourinary: Negative  Musculoskeletal: Positive for gait problem (wheelchair)  Skin: Negative  Allergic/Immunologic: Negative  Hematological: Negative  Psychiatric/Behavioral: Negative  Objective:  I have reviewed and made appropriate changes to the review of systems input by the medical assistant  There were no vitals filed for this visit  Patient Active Problem List   Diagnosis    Hypoglycemia    Acute encephalopathy    Acute kidney injury superimposed on chronic kidney disease ([facility] )    Essential hypertension    Hyperlipidemia    BPH (benign prostatic hyperplasia)    SIRS (systemic inflammatory response syndrome) (HCC)    Alcohol abuse    Acute deep vein thrombosis (DVT) of left femoral vein (HCC)       No past surgical history on file  No family history on file      Social History     Socioeconomic History    Marital status: /Civil Union     Spouse name: Not on file    Number of children: Not on file    Years of education: Not on file    Highest education level: Not on file   Occupational History    Not on file   Tobacco Use    Smoking status: Never Smoker    Smokeless tobacco: Never Used   Vaping Use    Vaping Use: Never used   Substance and Sexual Activity    Alcohol use: Yes     Alcohol/week: 35 0 standard drinks     Types: 35 Glasses of wine per week    Drug use: Never    Sexual activity: Not on file   Other Topics Concern    Not on file   Social History Narrative    Not on file     Social Determinants of Health     Financial Resource Strain: Not on file   Food Insecurity: No Food Insecurity    Worried About Running Out of Food in the Last Year: Never true    Renita of Food in the Last Year: Never true   Transportation Needs: No Transportation Needs    Lack of Transportation (Medical): No    Lack of Transportation (Non-Medical):  No   Physical Activity: Not on file   Stress: Not on file   Social Connections: Not on file   Intimate Partner Violence: Not on file   Housing Stability: Low Risk     Unable to Pay for Housing in the Last Year: No    Number of Places Lived in the Last Year: 1    Unstable Housing in the Last Year: No       No Known Allergies      Current Outpatient Medications:     acetaminophen (TYLENOL) 500 mg tablet, , Disp: , Rfl:     allopurinol (ZYLOPRIM) 100 mg tablet, Take 100 mg by mouth daily, Disp: , Rfl:     allopurinol (ZYLOPRIM) 300 mg tablet, Take 150 mg by mouth daily at bedtime, Disp: , Rfl:     amLODIPine (NORVASC) 10 mg tablet, Take 10 mg by mouth daily, Disp: , Rfl:     Bismuth Tribromoph-Petrolatum (Xeroform Petrolat Gauze 5"x9") MISC, , Disp: , Rfl:     Eliquis 5 MG, TAKE TWO TABLETS BY MOUTH TWICE A DAY FOR 7 DAYS, Disp: , Rfl:     folic acid (FOLVITE) 1 mg tablet, , Disp: , Rfl:     lisinopril (ZESTRIL) 10 mg tablet, Take 10 mg by mouth daily, Disp: , Rfl:     lisinopril (ZESTRIL) 20 mg tablet, 20 mg, Disp: , Rfl:     loratadine (CLARITIN) 10 mg tablet, Take 10 mg by mouth daily, Disp: , Rfl:     miconazole (MICOTIN) 2 % powder, , Disp: , Rfl:     Multiple Vitamin (Tab-A-Jeffrey) TABS, , Disp: , Rfl:     pravastatin (PRAVACHOL) 40 mg tablet, , Disp: , Rfl:     simvastatin (ZOCOR) 20 mg tablet, Take 20 mg by mouth every evening, Disp: , Rfl:     tamsulosin (FLOMAX) 0 4 mg, Take 0 4 mg by mouth daily with dinner, Disp: , Rfl:     There were no vitals taken for this visit  Physical Exam  Vitals and nursing note reviewed  Constitutional:       Appearance: Normal appearance  HENT:      Head: Normocephalic and atraumatic  Eyes:      Extraocular Movements: Extraocular movements intact  Cardiovascular:      Pulses:           Dorsalis pedis pulses are 2+ on the right side and 2+ on the left side  Heart sounds: Normal heart sounds  Pulmonary:      Effort: Pulmonary effort is normal       Breath sounds: Normal breath sounds  Abdominal:      General: Bowel sounds are normal       Palpations: Abdomen is soft  Musculoskeletal:         General: Swelling present  Normal range of motion  Comments: Patient in wheelchair  1+ bilateral lower extremity swelling, left greater than right  No venous stasis changes  No significant varicosities  Skin:     General: Skin is warm  Neurological:      General: No focal deficit present  Mental Status: He is alert and oriented to person, place, and time     Psychiatric:         Mood and Affect: Mood normal          Behavior: Behavior normal

## 2022-08-18 NOTE — PATIENT INSTRUCTIONS
26-year-old male with HTN, HLD, CKD, history of colon CA s/p resection '70's, left lower extremity DVT initially started on Xarelto and since changed to Eliquis therapeutic dosing  Presents for vascular evaluation  Mild bilateral lower extremity swelling, left leg pain improving   Appears to be a provoked DVT secondary to sedentary behavior as evidenced by 2 falls and 1 hospitalization   Recommended continuing Eliquis, therapeutic dosing 10 mg b i d  X 7 days and then 5 mg b i d  Recommended 6 months of therapy due to extent of blood clot   Easily palpable 2+ pedal pulses bilaterally   Tubigrip compression given to start    Rx light compression given for ease of use   Compression socks to be worn daily and removed at night   Periodic leg elevation to help manage swelling   Agree with regular ambulation, supervised with PT  Will repeat venous duplex in 6 months   Follow up in the office in 6 months

## 2022-08-18 NOTE — LETTER
August 19, 2022     Eugenie Wilkerson MD  [address]  [place] [address]    Patient: Gali Xie   YOB: 1925   Date of Visit: 8/18/2022       Dear Dr Avitia Kind:    Thank you for referring Gali Xie to me for evaluation  Below are my notes for this consultation  If you have questions, please do not hesitate to call me  I look forward to following your patient along with you  Sincerely,        ASTRID Flores        CC: MD Carlos Talbot CRNP  8/19/2022  5:31 PM  Sign when Signing Visit  Assessment/Plan:    Acute deep vein thrombosis (DVT) of left femoral vein ([address] )  44-year-old male with HTN, HLD, CKD, history of colon CA s/p resection '70's, left lower extremity DVT initially started on Xarelto and since changed to Eliquis therapeutic dosing  Presents for vascular evaluation  -Mild bilateral lower extremity swelling, left leg pain improving   -Appears to be a provoked DVT secondary to sedentary behavior as evidenced by 2 falls and 1 hospitalization   -Recommended continuing Eliquis, therapeutic dosing 10 mg b i d  X 7 days and then 5 mg b i d  -Recommended 6 months of therapy due to extent of blood clot   -Easily palpable 2+ pedal pulses bilaterally   -Tubigrip compression given to start  Rx light compression given for ease of use   -Compression socks to be worn daily and removed at night   -Periodic leg elevation to help manage swelling -Agree with regular ambulation, supervised with PT  -Will repeat venous duplex in 6 months   -Follow up in the office in 6 months       Diagnoses and all orders for this visit:    Acute deep vein thrombosis (DVT) of left femoral vein (HCC)  -     VAS lower limb venous duplex study, complete bilateral; Future  -     Compression Stocking    Other orders  -     acetaminophen (TYLENOL) 500 mg tablet  -     allopurinol (ZYLOPRIM) 100 mg tablet;  Take 100 mg by mouth daily  -     Eliquis 5 MG; TAKE TWO TABLETS BY MOUTH TWICE A DAY FOR 7 DAYS  -     Bismuth Tribromoph-Petrolatum (Xeroform Petrolat Gauze 5"x9") MISC  -     folic acid (FOLVITE) 1 mg tablet  -     lisinopril (ZESTRIL) 10 mg tablet; Take 10 mg by mouth daily  -     miconazole (MICOTIN) 2 % powder  -     Multiple Vitamin (Tab-A-Jeffrey) TABS  -     Discontinue: Omega-3 Fatty Acids (fish oil) 1,000 mg  -     pravastatin (PRAVACHOL) 40 mg tablet  -     Discontinue: Xarelto 10 MG tablet          Subjective:      Patient ID: Isma Vasques is a 80 y o  male  Patient is new to our practice  Patient c/o pain at times not often  Patient does not walk at home just chair changing or standing  Patient relies on wheelchair  Patient son states he has had multiples falls and now has a clot on his left leg  Patient does not wears compression stockings  Patient had a LEV done on 8/2/22  Patient is currently taking Simvastatin  HPI  51-year-old male with HTN, HLD, CKD, history of colon CA s/p resection '70's, left lower extremity DVT initially started on Xarelto and since changed to Eliquis therapeutic dosing  Presents for vascular evaluation  Patient currently in a wheelchair  He is accompanied by his son-in-law and daughter on speaker phone  Patient resides at [facility] [place]   Patient had lower extremity venous duplex 8/12/22 that showed left femoral popliteal DVT and presents for vascular evaluation  Patient started on Xarelto initially and then transition to Eliquis, therapeutic dosing by MARINO Best  Upon examination he has mild bilateral lower extremity swelling, left greater than right  He fell upon standing on June 30th while at [facility]   He had some difficulty with bearing weight on his left leg  He was taken to [address] [place] [address] Drive facility and evaluated with x-ray with no evidence of fracture  He has since been using a wheelchair  He fell/twisted his ankle again 3 days later from the recliner getting into the wheelchair    He developed a large bruise and swelling which is since improved  He was hospitalized 7/25 to 7/28 at [address] [place] [address] secondary to change in mental status, low blood sugar in the 30s  On August 12 he was evaluated with venous duplex due to left leg swelling which showed acute versus subacute nonocclusive thrombus in the common femoral vein, proximal vein, mid and distal femoral vein, popliteal vein and paired peroneal veins and 1 of the paired posterior tibial veins on the left lower extremity  Right lower extremity was negative for DVT/SVT  Patient denies any prior occurrence of DVT  Venous insufficiency as prior diagnosis in epic EMR  He previously did not wear any compression socks  He is now doing limited walking with physical therapy at facility  He has improvement in left lower extremity pain  He does have history of colon cancer and is status post resection in the 1970s  Last colonoscopy was 10 years ago  He is a nonsmoker  He does enjoy drinking wine and measuring up to a bottle a night  He is no longer drinking wine since recent hospitalization and starting anticoagulation  The following portions of the patient's history were reviewed and updated as appropriate: allergies, current medications, past family history, past medical history, past social history, past surgical history and problem list   ROS reviewed     Review of Systems   Constitutional: Negative  HENT: Negative  Eyes: Negative  Respiratory: Negative  Cardiovascular: Negative  Gastrointestinal: Negative  Endocrine: Negative  Genitourinary: Negative  Musculoskeletal: Positive for gait problem (wheelchair)  Skin: Negative  Allergic/Immunologic: Negative  Hematological: Negative  Psychiatric/Behavioral: Negative  Objective:  I have reviewed and made appropriate changes to the review of systems input by the medical assistant  There were no vitals filed for this visit      Patient Active Problem List   Diagnosis    Hypoglycemia    Acute encephalopathy    Acute kidney injury superimposed on chronic kidney disease ([facility] )    Essential hypertension    Hyperlipidemia    BPH (benign prostatic hyperplasia)    SIRS (systemic inflammatory response syndrome) (HCC)    Alcohol abuse    Acute deep vein thrombosis (DVT) of left femoral vein (HCC)       No past surgical history on file  No family history on file  Social History     Socioeconomic History    Marital status: /Civil Union     Spouse name: Not on file    Number of children: Not on file    Years of education: Not on file    Highest education level: Not on file   Occupational History    Not on file   Tobacco Use    Smoking status: Never Smoker    Smokeless tobacco: Never Used   Vaping Use    Vaping Use: Never used   Substance and Sexual Activity    Alcohol use: Yes     Alcohol/week: 35 0 standard drinks     Types: 35 Glasses of wine per week    Drug use: Never    Sexual activity: Not on file   Other Topics Concern    Not on file   Social History Narrative    Not on file     Social Determinants of Health     Financial Resource Strain: Not on file   Food Insecurity: No Food Insecurity    Worried About Running Out of Food in the Last Year: Never true    Renita of Food in the Last Year: Never true   Transportation Needs: No Transportation Needs    Lack of Transportation (Medical): No    Lack of Transportation (Non-Medical):  No   Physical Activity: Not on file   Stress: Not on file   Social Connections: Not on file   Intimate Partner Violence: Not on file   Housing Stability: Low Risk     Unable to Pay for Housing in the Last Year: No    Number of Places Lived in the Last Year: 1    Unstable Housing in the Last Year: No       No Known Allergies      Current Outpatient Medications:     acetaminophen (TYLENOL) 500 mg tablet, , Disp: , Rfl:     allopurinol (ZYLOPRIM) 100 mg tablet, Take 100 mg by mouth daily, Disp: , Rfl:     allopurinol (ZYLOPRIM) 300 mg tablet, Take 150 mg by mouth daily at bedtime, Disp: , Rfl:     amLODIPine (NORVASC) 10 mg tablet, Take 10 mg by mouth daily, Disp: , Rfl:     Bismuth Tribromoph-Petrolatum (Xeroform Petrolat Gauze 5"x9") MISC, , Disp: , Rfl:     Eliquis 5 MG, TAKE TWO TABLETS BY MOUTH TWICE A DAY FOR 7 DAYS, Disp: , Rfl:     folic acid (FOLVITE) 1 mg tablet, , Disp: , Rfl:     lisinopril (ZESTRIL) 10 mg tablet, Take 10 mg by mouth daily, Disp: , Rfl:     lisinopril (ZESTRIL) 20 mg tablet, 20 mg, Disp: , Rfl:     loratadine (CLARITIN) 10 mg tablet, Take 10 mg by mouth daily, Disp: , Rfl:     miconazole (MICOTIN) 2 % powder, , Disp: , Rfl:     Multiple Vitamin (Tab-A-Jeffrey) TABS, , Disp: , Rfl:     pravastatin (PRAVACHOL) 40 mg tablet, , Disp: , Rfl:     simvastatin (ZOCOR) 20 mg tablet, Take 20 mg by mouth every evening, Disp: , Rfl:     tamsulosin (FLOMAX) 0 4 mg, Take 0 4 mg by mouth daily with dinner, Disp: , Rfl:     There were no vitals taken for this visit  Physical Exam  Vitals and nursing note reviewed  Constitutional:       Appearance: Normal appearance  HENT:      Head: Normocephalic and atraumatic  Eyes:      Extraocular Movements: Extraocular movements intact  Cardiovascular:      Pulses:           Dorsalis pedis pulses are 2+ on the right side and 2+ on the left side  Heart sounds: Normal heart sounds  Pulmonary:      Effort: Pulmonary effort is normal       Breath sounds: Normal breath sounds  Abdominal:      General: Bowel sounds are normal       Palpations: Abdomen is soft  Musculoskeletal:         General: Swelling present  Normal range of motion  Comments: Patient in wheelchair  1+ bilateral lower extremity swelling, left greater than right  No venous stasis changes  No significant varicosities  Skin:     General: Skin is warm  Neurological:      General: No focal deficit present        Mental Status: He is alert and oriented to person, place, and time    Psychiatric:         Mood and Affect: Mood normal          Behavior: Behavior normal

## 2022-12-19 ENCOUNTER — LAB REQUISITION (OUTPATIENT)
Dept: LAB | Facility: HOSPITAL | Age: 87
End: 2022-12-19

## 2022-12-19 DIAGNOSIS — Z20.828 CONTACT WITH AND (SUSPECTED) EXPOSURE TO OTHER VIRAL COMMUNICABLE DISEASES: ICD-10-CM

## 2022-12-19 LAB — SARS-COV-2 RNA RESP QL NAA+PROBE: NEGATIVE

## 2023-02-20 ENCOUNTER — HOSPITAL ENCOUNTER (OUTPATIENT)
Dept: NON INVASIVE DIAGNOSTICS | Facility: HOSPITAL | Age: 88
Discharge: HOME | End: 2023-02-20

## 2023-02-20 DIAGNOSIS — I82.412 ACUTE DEEP VEIN THROMBOSIS (DVT) OF LEFT FEMORAL VEIN (HCC): ICD-10-CM

## 2023-03-27 ENCOUNTER — OFFICE VISIT (OUTPATIENT)
Dept: VASCULAR SURGERY | Facility: CLINIC | Age: 88
End: 2023-03-27

## 2023-03-27 VITALS
HEIGHT: 68 IN | SYSTOLIC BLOOD PRESSURE: 120 MMHG | WEIGHT: 129 LBS | DIASTOLIC BLOOD PRESSURE: 60 MMHG | BODY MASS INDEX: 19.55 KG/M2

## 2023-03-27 DIAGNOSIS — I82.512 CHRONIC DEEP VEIN THROMBOSIS (DVT) OF FEMORAL VEIN OF LEFT LOWER EXTREMITY (HCC): Primary | ICD-10-CM

## 2023-03-27 DIAGNOSIS — R22.41 LOCALIZED SWELLING, MASS AND LUMP, LOWER LIMB, RIGHT: ICD-10-CM

## 2023-03-27 DIAGNOSIS — I82.412 ACUTE DEEP VEIN THROMBOSIS (DVT) OF LEFT FEMORAL VEIN (HCC): ICD-10-CM

## 2023-03-27 NOTE — PATIENT INSTRUCTIONS
#1 Acute left lower extremity DVT, completed 6 months of Eliquis therapy  Likely provoked secondary to hospitalization and immobility at that time  Repeat venous duplex shows near complete resolution of left lower extremity DVT with chronic thrombosis of distal left femoral vein  Easily palpable pedal pulses bilaterally  Recommended resume aspirin 81 mg daily to decrease risk of VTE  Continue to use Tubigrip/Medigrip, size E (rx given), replace new sleeve every 4 weeks   Follow up PRN  #2 Localized R medial thigh swelling, non tender - recommended evaluation with soft tissue u/s at [facility]

## 2023-03-27 NOTE — PROGRESS NOTES
Assessment/Plan:    Chronic deep vein thrombosis (DVT) of femoral vein of left lower extremity ([address] )  80year-old male with HTN, HLD, CKD, history of colon CA s/p resection '70's, provoked acute vs subacute non-occlusive thrombus L CFV/FV/popliteal/paired peroneal/PT DVT  DVT treated with 6 months of anticoagulation  Patient returns to the office to review LEVD 2/20/23    -Acute left lower extremity DVT, completed 6 months of Eliquis therapy, was discontinued by [facility]  -Likely provoked secondary to hospitalization and immobility at that time  -Repeat venous duplex shows near complete resolution of left lower extremity DVT with chronic thrombosis of distal left femoral vein  -Easily palpable pedal pulses bilaterally  -Recommended resume aspirin 81 mg daily to decrease risk of VTE  -Continue to use Tubigrip/Medigrip, size E (rx given), replace new sleeve every 4 weeks  -Localized R medial thigh swelling, non tender - recommended evaluation with soft tissue u/s at [address]   -Follow up PRN         Diagnoses and all orders for this visit:    Chronic deep vein thrombosis (DVT) of femoral vein of left lower extremity (HCC)  -     Compression bandages    Acute deep vein thrombosis (DVT) of left femoral vein ([address] )  -     Ambulatory Referral to Vascular Surgery    Localized swelling, mass and lump, lower limb, right          Subjective:      Patient ID: Desire Sepulveda is a 80 y o  male  HPI  Patient presents to our office for monitoring of LLE DVT and to review LEV done 2/20/23  Pt denies hotness or swelling in BLE  Pt wears compression daily  Pt is taking Simvastatin    43-year-old male with HTN, HLD, CKD, history of colon CA s/p resection '70's, provoked acute vs subacute non-occlusive thrombus L CFV/FV/popliteal/paired peroneal/PT DVT  DVT treated with 6 months of anticoagulation  Patient returns to the office to review LEVD 2/20/23    Patient resides at [facility]   He is companied by his daughter for visit today  "Initially seen for Acute left lower extremity DVT  He was on Xarelto and then transition to Eliquis  He completed 6 months of Eliquis therapy and discontinued by Dr Andrea Suárez after 6 months  Likely provoked secondary to hospitalization and immobility at that time  Repeat venous duplex shows near complete resolution of left lower extremity DVT with chronic thrombosis of distal left femoral vein  He was previously on aspirin prior to DVT and will resume    The following portions of the patient's history were reviewed and updated as appropriate: allergies, current medications, past family history, past medical history, past social history, past surgical history and problem list   ROS reviewed     Review of Systems      Objective:    I have reviewed and made appropriate changes to the review of systems input by the medical assistant  Vitals:    03/27/23 1057   BP: 120/60   BP Location: Left arm   Patient Position: Sitting   Cuff Size: Standard   Weight: 58 5 kg (129 lb)   Height: 5' 8\" (1 727 m)       Patient Active Problem List   Diagnosis   • Hypoglycemia   • Acute encephalopathy   • Acute kidney injury superimposed on chronic kidney disease (HCC)   • Essential hypertension   • Hyperlipidemia   • BPH (benign prostatic hyperplasia)   • SIRS (systemic inflammatory response syndrome) (HCC)   • Alcohol abuse   • Acute deep vein thrombosis (DVT) of left femoral vein (HCC)   • Chronic deep vein thrombosis (DVT) of femoral vein of left lower extremity (HCC)       No past surgical history on file  No family history on file      Social History     Socioeconomic History   • Marital status: /Civil Union     Spouse name: Not on file   • Number of children: Not on file   • Years of education: Not on file   • Highest education level: Not on file   Occupational History   • Not on file   Tobacco Use   • Smoking status: Never   • Smokeless tobacco: Never   Vaping Use   • Vaping Use: Never used   Substance and Sexual " "Activity   • Alcohol use: Yes     Alcohol/week: 35 0 standard drinks     Types: 35 Glasses of wine per week   • Drug use: Never   • Sexual activity: Not on file   Other Topics Concern   • Not on file   Social History Narrative   • Not on file     Social Determinants of Health     Financial Resource Strain: Not on file   Food Insecurity: No Food Insecurity   • Worried About Running Out of Food in the Last Year: Never true   • Ran Out of Food in the Last Year: Never true   Transportation Needs: No Transportation Needs   • Lack of Transportation (Medical): No   • Lack of Transportation (Non-Medical):  No   Physical Activity: Not on file   Stress: Not on file   Social Connections: Not on file   Intimate Partner Violence: Not on file   Housing Stability: Low Risk    • Unable to Pay for Housing in the Last Year: No   • Number of Places Lived in the Last Year: 1   • Unstable Housing in the Last Year: No       No Known Allergies      Current Outpatient Medications:   •  acetaminophen (TYLENOL) 500 mg tablet, , Disp: , Rfl:   •  allopurinol (ZYLOPRIM) 300 mg tablet, Take 150 mg by mouth daily at bedtime, Disp: , Rfl:   •  amLODIPine (NORVASC) 10 mg tablet, Take 10 mg by mouth daily, Disp: , Rfl:   •  bisacodyl (FLEET) 10 MG/30ML ENEM, Insert 10 mg into the rectum once, Disp: , Rfl:   •  Bismuth Tribromoph-Petrolatum (Xeroform Petrolat Gauze 5\"x9\") MISC, , Disp: , Rfl:   •  lisinopril (ZESTRIL) 10 mg tablet, Take 10 mg by mouth daily, Disp: , Rfl:   •  lisinopril (ZESTRIL) 20 mg tablet, 20 mg, Disp: , Rfl:   •  miconazole (MICOTIN) 2 % powder, , Disp: , Rfl:   •  mineral oil-hydrophilic petrolatum (AQUAPHOR) ointment, Apply topically as needed for dry skin, Disp: , Rfl:   •  Multiple Vitamin (Tab-A-Jeffrey) TABS, , Disp: , Rfl:   •  simvastatin (ZOCOR) 20 mg tablet, Take 20 mg by mouth every evening, Disp: , Rfl:   •  allopurinol (ZYLOPRIM) 100 mg tablet, Take 100 mg by mouth daily (Patient not taking: Reported on 3/27/2023), " "Disp: , Rfl:   •  Eliquis 5 MG, TAKE TWO TABLETS BY MOUTH TWICE A DAY FOR 7 DAYS (Patient not taking: Reported on 3/27/2023), Disp: , Rfl:   •  folic acid (FOLVITE) 1 mg tablet, , Disp: , Rfl:   •  hydrocortisone in white petrolatum-mineral oil cream, Apply topically (Patient not taking: Reported on 3/27/2023), Disp: , Rfl:   •  loratadine (CLARITIN) 10 mg tablet, Take 10 mg by mouth daily (Patient not taking: Reported on 3/27/2023), Disp: , Rfl:   •  pravastatin (PRAVACHOL) 40 mg tablet, , Disp: , Rfl:   •  tamsulosin (FLOMAX) 0 4 mg, Take 0 4 mg by mouth daily with dinner (Patient not taking: Reported on 3/27/2023), Disp: , Rfl:     /60 (BP Location: Left arm, Patient Position: Sitting, Cuff Size: Standard)   Ht 5' 8\" (1 727 m)   Wt 58 5 kg (129 lb)   BMI 19 61 kg/m²          Physical Exam  Vitals and nursing note reviewed  Exam conducted with a chaperone present  Constitutional:       Appearance: He is well-developed  HENT:      Head: Normocephalic and atraumatic  Eyes:      Extraocular Movements: Extraocular movements intact  Cardiovascular:      Rate and Rhythm: Normal rate  Pulses:           Femoral pulses are 2+ on the right side and 2+ on the left side  Dorsalis pedis pulses are 0 on the right side and 0 on the left side  Posterior tibial pulses are 0 on the right side and 0 on the left side  Heart sounds: Normal heart sounds  Abdominal:      Palpations: Abdomen is soft  Musculoskeletal:      Cervical back: Neck supple  Right lower leg: No edema  Comments: Nontender localized swelling right medial proximal thigh   Skin:     General: Skin is warm  Neurological:      General: No focal deficit present  Mental Status: He is alert and oriented to person, place, and time     Psychiatric:         Behavior: Behavior normal          "

## 2023-04-27 NOTE — ASSESSMENT & PLAN NOTE
55-year-old male with HTN, HLD, CKD, history of colon CA s/p resection '70's, provoked acute vs subacute non-occlusive thrombus L CFV/FV/popliteal/paired peroneal/PT DVT  DVT treated with 6 months of anticoagulation   Patient returns to the office to review LEVD 2/20/23    -Acute left lower extremity DVT, completed 6 months of Eliquis therapy, was discontinued by [place]  -Likely provoked secondary to hospitalization and immobility at that time  -Repeat venous duplex shows near complete resolution of left lower extremity DVT with chronic thrombosis of distal left femoral vein  -Easily palpable pedal pulses bilaterally  -Recommended resume aspirin 81 mg daily to decrease risk of VTE  -Continue to use Tubigrip/Medigrip, size E (rx given), replace new sleeve every 4 weeks  -Localized R medial thigh swelling, non tender - recommended evaluation with soft tissue u/s at [address] [facility]   -Follow up PRN

## 2024-01-29 ENCOUNTER — PREP FOR PROCEDURE (OUTPATIENT)
Dept: SURGERY | Facility: CLINIC | Age: 89
End: 2024-01-29

## 2024-01-29 ENCOUNTER — OFFICE VISIT (OUTPATIENT)
Dept: SURGERY | Facility: CLINIC | Age: 89
End: 2024-01-29
Payer: MEDICARE

## 2024-01-29 VITALS
DIASTOLIC BLOOD PRESSURE: 71 MMHG | BODY MASS INDEX: 20.09 KG/M2 | HEART RATE: 92 BPM | WEIGHT: 125 LBS | SYSTOLIC BLOOD PRESSURE: 116 MMHG | HEIGHT: 66 IN

## 2024-01-29 DIAGNOSIS — K40.90 NON-RECURRENT UNILATERAL INGUINAL HERNIA WITHOUT OBSTRUCTION OR GANGRENE: Primary | ICD-10-CM

## 2024-01-29 DIAGNOSIS — C18.9 CARCINOMA OF COLON (HCC): ICD-10-CM

## 2024-01-29 DIAGNOSIS — K40.90 INGUINAL HERNIA: Primary | ICD-10-CM

## 2024-01-29 PROCEDURE — 99204 OFFICE O/P NEW MOD 45 MIN: CPT | Performed by: SURGERY

## 2024-01-29 RX ORDER — TAMSULOSIN HYDROCHLORIDE 0.4 MG/1
0.4 CAPSULE ORAL
Qty: 10 CAPSULE | Refills: 1 | Status: SHIPPED | OUTPATIENT
Start: 2024-01-29

## 2024-01-29 NOTE — H&P (VIEW-ONLY)
"Assessment/Plan: Patient is a 89-year-old who presents with a large right inguinal hernia.  Has been present for several years.  Is been worse lately after coughing episodes.  He denies abdominal pain.    Operative repair is recommended if he is medically stable.  [facility].  Patient is agreeable.  Family is agreeable.  All questions answered.    There are no diagnoses linked to this encounter.    Subjective:      Patient ID: Demarco Medrano is a 89 y.o. male.    Patient presents for right inguinal hernia consult.  States he has had a bulge RLQ for one month.  Denies pain.        The following portions of the patient's history were reviewed and updated as appropriate:     He  has a past medical history of CKD (chronic kidney disease) stage 3, GFR 30-59 ml/min ([facility]), Essential (primary) hypertension, Hyperlipidemia, Polyp of colon, and Venous insufficiency of lower extremity, unspecified laterality.  He  has no past surgical history on file.  His family history is not on file.  He  reports that he has never smoked. He has never used smokeless tobacco. He reports current alcohol use of about 35.0 standard drinks of alcohol per week. He reports that he does not use drugs.  Current Outpatient Medications   Medication Sig Dispense Refill    acetaminophen (TYLENOL) 500 mg tablet       allopurinol (ZYLOPRIM) 100 mg tablet Take 100 mg by mouth daily (Patient not taking: Reported on 3/27/2023)      allopurinol (ZYLOPRIM) 300 mg tablet Take 150 mg by mouth daily at bedtime      amLODIPine (NORVASC) 10 mg tablet Take 10 mg by mouth daily      bisacodyl (FLEET) 10 MG/30ML ENEM Insert 10 mg into the rectum once      Bismuth Tribromoph-Petrolatum (Xeroform Petrolat Gauze 5\"x9\") MISC       folic acid (FOLVITE) 1 mg tablet  (Patient not taking: Reported on 3/27/2023)      hydrocortisone in white petrolatum-mineral oil cream Apply topically (Patient not taking: Reported on 3/27/2023) " "     lisinopril (ZESTRIL) 10 mg tablet Take 10 mg by mouth daily      lisinopril (ZESTRIL) 20 mg tablet 20 mg      loratadine (CLARITIN) 10 mg tablet Take 10 mg by mouth daily (Patient not taking: Reported on 3/27/2023)      miconazole (MICOTIN) 2 % powder       mineral oil-hydrophilic petrolatum (AQUAPHOR) ointment Apply topically as needed for dry skin      Multiple Vitamin (Tab-A-Jeffrey) TABS       pravastatin (PRAVACHOL) 40 mg tablet  (Patient not taking: Reported on 3/27/2023)      simvastatin (ZOCOR) 20 mg tablet Take 20 mg by mouth every evening      tamsulosin (FLOMAX) 0.4 mg Take 0.4 mg by mouth daily with dinner (Patient not taking: Reported on 3/27/2023)       No current facility-administered medications for this visit.     He has No Known Allergies..    Review of Systems   Musculoskeletal:  Positive for arthralgias.   Neurological:         Hard of hearing           Objective:      /71   Pulse 92   Ht 5' 6\" (1.676 m)   Wt 56.7 kg (125 lb)   BMI 20.18 kg/m²          Physical Exam  Constitutional:       Appearance: Normal appearance.   Eyes:      Extraocular Movements: Extraocular movements intact.   Cardiovascular:      Rate and Rhythm: Normal rate and regular rhythm.   Pulmonary:      Effort: Pulmonary effort is normal.      Breath sounds: Normal breath sounds.   Abdominal:      Hernia: A hernia (Large right inguinal hernia.) is present.   Skin:     General: Skin is warm and dry.         "

## 2024-01-29 NOTE — PROGRESS NOTES
"Assessment/Plan: Patient is a 89-year-old who presents with a large right inguinal hernia.  Has been present for several years.  Is been worse lately after coughing episodes.  He denies abdominal pain.    Operative repair is recommended if he is medically stable.  [facility].  Patient is agreeable.  Family is agreeable.  All questions answered.    There are no diagnoses linked to this encounter.    Subjective:      Patient ID: Demarco Medrano is a 89 y.o. male.    Patient presents for right inguinal hernia consult.  States he has had a bulge RLQ for one month.  Denies pain.        The following portions of the patient's history were reviewed and updated as appropriate:     He  has a past medical history of CKD (chronic kidney disease) stage 3, GFR 30-59 ml/min ([facility]), Essential (primary) hypertension, Hyperlipidemia, Polyp of colon, and Venous insufficiency of lower extremity, unspecified laterality.  He  has no past surgical history on file.  His family history is not on file.  He  reports that he has never smoked. He has never used smokeless tobacco. He reports current alcohol use of about 35.0 standard drinks of alcohol per week. He reports that he does not use drugs.  Current Outpatient Medications   Medication Sig Dispense Refill    acetaminophen (TYLENOL) 500 mg tablet       allopurinol (ZYLOPRIM) 100 mg tablet Take 100 mg by mouth daily (Patient not taking: Reported on 3/27/2023)      allopurinol (ZYLOPRIM) 300 mg tablet Take 150 mg by mouth daily at bedtime      amLODIPine (NORVASC) 10 mg tablet Take 10 mg by mouth daily      bisacodyl (FLEET) 10 MG/30ML ENEM Insert 10 mg into the rectum once      Bismuth Tribromoph-Petrolatum (Xeroform Petrolat Gauze 5\"x9\") MISC       folic acid (FOLVITE) 1 mg tablet  (Patient not taking: Reported on 3/27/2023)      hydrocortisone in white petrolatum-mineral oil cream Apply topically (Patient not taking: Reported on 3/27/2023) " "     lisinopril (ZESTRIL) 10 mg tablet Take 10 mg by mouth daily      lisinopril (ZESTRIL) 20 mg tablet 20 mg      loratadine (CLARITIN) 10 mg tablet Take 10 mg by mouth daily (Patient not taking: Reported on 3/27/2023)      miconazole (MICOTIN) 2 % powder       mineral oil-hydrophilic petrolatum (AQUAPHOR) ointment Apply topically as needed for dry skin      Multiple Vitamin (Tab-A-Jeffrey) TABS       pravastatin (PRAVACHOL) 40 mg tablet  (Patient not taking: Reported on 3/27/2023)      simvastatin (ZOCOR) 20 mg tablet Take 20 mg by mouth every evening      tamsulosin (FLOMAX) 0.4 mg Take 0.4 mg by mouth daily with dinner (Patient not taking: Reported on 3/27/2023)       No current facility-administered medications for this visit.     He has No Known Allergies..    Review of Systems   Musculoskeletal:  Positive for arthralgias.   Neurological:         Hard of hearing           Objective:      /71   Pulse 92   Ht 5' 6\" (1.676 m)   Wt 56.7 kg (125 lb)   BMI 20.18 kg/m²          Physical Exam  Constitutional:       Appearance: Normal appearance.   Eyes:      Extraocular Movements: Extraocular movements intact.   Cardiovascular:      Rate and Rhythm: Normal rate and regular rhythm.   Pulmonary:      Effort: Pulmonary effort is normal.      Breath sounds: Normal breath sounds.   Abdominal:      Hernia: A hernia (Large right inguinal hernia.) is present.   Skin:     General: Skin is warm and dry.         "

## 2024-02-05 ENCOUNTER — TELEPHONE (OUTPATIENT)
Age: 89
End: 2024-02-05

## 2024-02-05 ENCOUNTER — OFFICE VISIT (OUTPATIENT)
Dept: LAB | Facility: HOSPITAL | Age: 89
End: 2024-02-05
Payer: MEDICARE

## 2024-02-05 DIAGNOSIS — K40.90 INGUINAL HERNIA: ICD-10-CM

## 2024-02-05 LAB
ATRIAL RATE: 96 BPM
QRS AXIS: -69 DEGREES
QRSD INTERVAL: 70 MS
QT INTERVAL: 426 MS
QTC INTERVAL: 509 MS
T WAVE AXIS: 194 DEGREES
VENTRICULAR RATE: 86 BPM

## 2024-02-05 PROCEDURE — 93005 ELECTROCARDIOGRAM TRACING: CPT

## 2024-02-05 NOTE — TELEPHONE ENCOUNTER
Patients daughter Nhi called with questions related to Mr Lubin upcoming surgery with Dr Mcgraw on 2/21. [facility]     Daughter looking for clarity on preoperative stool softener, was unsure if  Bisacodyl or Docusate Sodium.     The patients daughter was also curious if Mr Medrano should have orders for any type of rehab post operatively as he resides in the residential side of a care facility with a subacute rehab facility attached.    Daughter Nhi  # [phone]

## 2024-02-05 NOTE — TELEPHONE ENCOUNTER
Returned daughter, Nhi's call.  Specified patient should use laxative, not stool softener.  Advised her to discuss post-op rehab with doctor Mcgraw on day of surgery.

## 2024-02-12 RX ORDER — BISACODYL 5 MG/1
5 TABLET, DELAYED RELEASE ORAL DAILY PRN
COMMUNITY

## 2024-02-15 ENCOUNTER — TELEPHONE (OUTPATIENT)
Age: 89
End: 2024-02-15

## 2024-02-15 NOTE — TELEPHONE ENCOUNTER
Patients angel Hankins called in to speak with Angela in regards to upcoming surgery and [facility] note from [facility]. Call transferred to Angela for assistance

## 2024-02-19 RX ORDER — MAGNESIUM HYDROXIDE 1200 MG/15ML
SUSPENSION ORAL DAILY PRN
COMMUNITY

## 2024-02-19 RX ORDER — CHOLECALCIFEROL (VITAMIN D3) 125 MCG
CAPSULE ORAL
COMMUNITY

## 2024-02-19 RX ORDER — MAG HYDROX/ALUMINUM HYD/SIMETH 400-400-40
SUSPENSION, ORAL (FINAL DOSE FORM) ORAL
COMMUNITY

## 2024-02-19 NOTE — PRE-PROCEDURE INSTRUCTIONS
Pre-Surgery Instructions:   Medication Instructions    acetaminophen (TYLENOL) 500 mg tablet PRN    allopurinol (ZYLOPRIM) 300 mg tablet Take as directed    amLODIPine (NORVASC) 10 mg tablet Take day of surgery.    bisacodyl (DULCOLAX) 5 mg EC tablet PRN    bisacodyl (FLEET) 10 MG/30ML ENEM PRN    lisinopril (ZESTRIL) 20 mg tablet Hold day of surgery.    loratadine (CLARITIN) 10 mg tablet Take as directed    magnesium hydroxide (Milk of Magnesia) 400 mg/5 mL oral suspension prn    Melatonin 5 MG TABS Take night before surgery    miconazole (MICOTIN) 2 % powder Hold day of surgery.    mineral oil-hydrophilic petrolatum (AQUAPHOR) ointment Hold day of surgery.    Multiple Vitamin (Tab-A-Jeffrey) TABS Stop taking 7 days prior to surgery.    polyethylene glycol-propylene glycol (Lubricant Eye Drops) 0.4-0.3 % Take day of surgery.    simvastatin (ZOCOR) 20 mg tablet Take day of surgery.    tamsulosin (FLOMAX) 0.4 mg Take day of surgery.    [DISCONTINUED] aspirin (ECOTRIN LOW STRENGTH) 81 mg EC tablet Not taking      Medication instructions for day surgery reviewed. Please use only a sip of water to take your instructed medications. Avoid all over the counter vitamins, supplements and NSAIDS for one week prior to surgery per anesthesia guidelines. Tylenol is ok to take as needed.     You will receive a call one business day prior to surgery with an arrival time and hospital directions. If your surgery is scheduled on a Monday, the hospital will be calling you on the Friday prior to your surgery. If you have not heard from anyone by 8pm, please call the hospital supervisor through the hospital  at [phone]. ([place] [phone] or [place] [phone]).    Do not eat or drink anything after midnight the night before your surgery, including candy, mints, lifesavers, or chewing gum. Do not drink alcohol 24hrs before your surgery. Try not to smoke at least 24hrs before your surgery.       Follow the pre surgery  showering instructions as listed in the “My Surgical Experience Booklet” or otherwise provided by your surgeon's office. Do not use a blade to shave the surgical area 1 week before surgery. It is okay to use a clean electric clippers up to 24 hours before surgery. Do not apply any lotions, creams, including makeup, cologne, deodorant, or perfumes after showering on the day of your surgery. Do not use dry shampoo, hair spray, hair gel, or any type of hair products.     No contact lenses, eye make-up, or artificial eyelashes. Remove nail polish, including gel polish, and any artificial, gel, or acrylic nails if possible. Remove all jewelry including rings and body piercing jewelry.     Wear causal clothing that is easy to take on and off. Consider your type of surgery.    Keep any valuables, jewelry, piercings at home. Please bring any specially ordered equipment (sling, braces) if indicated.    Arrange for a responsible person to drive you to and from the hospital on the day of your surgery. Please confirm the visitor policy for the day of your procedure when you receive your phone call with an arrival time.     Call the surgeon's office with any new illnesses, exposures, or additional questions prior to surgery.    Please reference your “My Surgical Experience Booklet” for additional information to prepare for your upcoming surgery.

## 2024-02-19 NOTE — PRE-PROCEDURE INSTRUCTIONS
Pre-Surgery Instructions:   Medication Instructions    allopurinol (ZYLOPRIM) 300 mg tablet Take day of surgery.    amLODIPine (NORVASC) 10 mg tablet Take day of surgery.    lisinopril (ZESTRIL) 20 mg tablet Hold day of surgery.    loratadine (CLARITIN) 10 mg tablet Take day of surgery.    miconazole (MICOTIN) 2 % powder Hold day of surgery.    mineral oil-hydrophilic petrolatum (AQUAPHOR) ointment Hold day of surgery.    Multiple Vitamin (Tab-A-Jeffrey) TABS Stop taking 7 days prior to surgery.    simvastatin (ZOCOR) 20 mg tablet Take night before surgery    tamsulosin (FLOMAX) 0.4 mg Take night before surgery    [DISCONTINUED] aspirin (ECOTRIN LOW STRENGTH) 81 mg EC tablet Instructions provided by MD/ continue HOLD per [facility]    Medication instructions for day surgery reviewed. Please use only a sip of water to take your instructed medications. Avoid all over the counter vitamins, supplements and NSAIDS for one week prior to surgery per anesthesia guidelines. Tylenol is ok to take as needed.     You will receive a call one business day prior to surgery with an arrival time and hospital directions. If your surgery is scheduled on a Monday, the hospital will be calling you on the Friday prior to your surgery. If you have not heard from anyone by 8pm, please call the hospital supervisor through the hospital  at [phone]. ([place] [phone] or [place] [phone]).    Do not eat or drink anything after midnight the night before your surgery, including candy, mints, lifesavers, or chewing gum. Do not drink alcohol 24hrs before your surgery. Try not to smoke at least 24hrs before your surgery.       Follow the pre surgery showering instructions as listed in the “My Surgical Experience Booklet” or otherwise provided by your surgeon's office. Do not use a blade to shave the surgical area 1 week before surgery. It is okay to use a clean electric clippers up to 24 hours before surgery. Do not apply any  lotions, creams, including makeup, cologne, deodorant, or perfumes after showering on the day of your surgery. Do not use dry shampoo, hair spray, hair gel, or any type of hair products.     No contact lenses, eye make-up, or artificial eyelashes. Remove nail polish, including gel polish, and any artificial, gel, or acrylic nails if possible. Remove all jewelry including rings and body piercing jewelry.     Wear causal clothing that is easy to take on and off. Consider your type of surgery.    Keep any valuables, jewelry, piercings at home. Please bring any specially ordered equipment (sling, braces) if indicated.    Arrange for a responsible person to drive you to and from the hospital on the day of your surgery. Please confirm the visitor policy for the day of your procedure when you receive your phone call with an arrival time.     Call the surgeon's office with any new illnesses, exposures, or additional questions prior to surgery.    Please reference your “My Surgical Experience Booklet” for additional information to prepare for your upcoming surgery.    Faxed to [facility] with [facility] phase II paperwork .

## 2024-02-21 ENCOUNTER — HOSPITAL ENCOUNTER (OUTPATIENT)
Facility: HOSPITAL | Age: 89
Setting detail: OUTPATIENT SURGERY
Discharge: HOME | End: 2024-02-21
Attending: SURGERY | Admitting: SURGERY
Payer: MEDICARE

## 2024-02-21 ENCOUNTER — ANESTHESIA (OUTPATIENT)
Dept: PERIOP | Facility: HOSPITAL | Age: 89
End: 2024-02-21
Payer: MEDICARE

## 2024-02-21 ENCOUNTER — ANESTHESIA EVENT (OUTPATIENT)
Dept: PERIOP | Facility: HOSPITAL | Age: 89
End: 2024-02-21
Payer: MEDICARE

## 2024-02-21 VITALS
SYSTOLIC BLOOD PRESSURE: 122 MMHG | TEMPERATURE: 98.1 F | OXYGEN SATURATION: 92 % | WEIGHT: 125.66 LBS | BODY MASS INDEX: 21.45 KG/M2 | RESPIRATION RATE: 20 BRPM | HEIGHT: 64 IN | DIASTOLIC BLOOD PRESSURE: 58 MMHG | HEART RATE: 84 BPM

## 2024-02-21 DIAGNOSIS — K40.90 NON-RECURRENT UNILATERAL INGUINAL HERNIA WITHOUT OBSTRUCTION OR GANGRENE: Primary | ICD-10-CM

## 2024-02-21 PROCEDURE — 49505 PRP I/HERN INIT REDUC >5 YR: CPT | Performed by: STUDENT IN AN ORGANIZED HEALTH CARE EDUCATION/TRAINING PROGRAM

## 2024-02-21 PROCEDURE — C1781 MESH (IMPLANTABLE): HCPCS | Performed by: SURGERY

## 2024-02-21 DEVICE — MODIFIED ONFLEX MESH
Type: IMPLANTABLE DEVICE | Site: INGUINAL | Status: FUNCTIONAL
Brand: MODIFIED ONFLEX MESH

## 2024-02-21 RX ORDER — TRAMADOL HYDROCHLORIDE 50 MG/1
50 TABLET ORAL EVERY 6 HOURS PRN
Qty: 10 TABLET | Refills: 0 | Status: SHIPPED | OUTPATIENT
Start: 2024-02-21 | End: 2024-03-02

## 2024-02-21 RX ORDER — FENTANYL CITRATE 50 UG/ML
INJECTION, SOLUTION INTRAMUSCULAR; INTRAVENOUS AS NEEDED
Status: DISCONTINUED | OUTPATIENT
Start: 2024-02-21 | End: 2024-02-21

## 2024-02-21 RX ORDER — ACETAMINOPHEN 325 MG/1
650 TABLET ORAL EVERY 4 HOURS PRN
Status: DISCONTINUED | OUTPATIENT
Start: 2024-02-21 | End: 2024-02-21 | Stop reason: HOSPADM

## 2024-02-21 RX ORDER — ONDANSETRON 2 MG/ML
4 INJECTION INTRAMUSCULAR; INTRAVENOUS ONCE AS NEEDED
Status: DISCONTINUED | OUTPATIENT
Start: 2024-02-21 | End: 2024-02-21 | Stop reason: HOSPADM

## 2024-02-21 RX ORDER — FENTANYL CITRATE/PF 50 MCG/ML
25 SYRINGE (ML) INJECTION
Status: DISCONTINUED | OUTPATIENT
Start: 2024-02-21 | End: 2024-02-21 | Stop reason: HOSPADM

## 2024-02-21 RX ORDER — EPHEDRINE SULFATE 50 MG/ML
INJECTION INTRAVENOUS AS NEEDED
Status: DISCONTINUED | OUTPATIENT
Start: 2024-02-21 | End: 2024-02-21

## 2024-02-21 RX ORDER — MAGNESIUM HYDROXIDE 1200 MG/15ML
LIQUID ORAL AS NEEDED
Status: DISCONTINUED | OUTPATIENT
Start: 2024-02-21 | End: 2024-02-21 | Stop reason: HOSPADM

## 2024-02-21 RX ORDER — BUPIVACAINE HYDROCHLORIDE 2.5 MG/ML
INJECTION, SOLUTION EPIDURAL; INFILTRATION; INTRACAUDAL AS NEEDED
Status: DISCONTINUED | OUTPATIENT
Start: 2024-02-21 | End: 2024-02-21 | Stop reason: HOSPADM

## 2024-02-21 RX ORDER — ONDANSETRON 2 MG/ML
4 INJECTION INTRAMUSCULAR; INTRAVENOUS EVERY 4 HOURS PRN
Status: DISCONTINUED | OUTPATIENT
Start: 2024-02-21 | End: 2024-02-21 | Stop reason: HOSPADM

## 2024-02-21 RX ORDER — FUROSEMIDE 10 MG/ML
5 INJECTION INTRAMUSCULAR; INTRAVENOUS ONCE
Status: COMPLETED | OUTPATIENT
Start: 2024-02-21 | End: 2024-02-21

## 2024-02-21 RX ORDER — ONDANSETRON 2 MG/ML
INJECTION INTRAMUSCULAR; INTRAVENOUS AS NEEDED
Status: DISCONTINUED | OUTPATIENT
Start: 2024-02-21 | End: 2024-02-21

## 2024-02-21 RX ORDER — DIPHENHYDRAMINE HYDROCHLORIDE 50 MG/ML
12.5 INJECTION INTRAMUSCULAR; INTRAVENOUS ONCE AS NEEDED
Status: DISCONTINUED | OUTPATIENT
Start: 2024-02-21 | End: 2024-02-21 | Stop reason: HOSPADM

## 2024-02-21 RX ORDER — HYDROMORPHONE HCL/PF 1 MG/ML
0.5 SYRINGE (ML) INJECTION
Status: DISCONTINUED | OUTPATIENT
Start: 2024-02-21 | End: 2024-02-21 | Stop reason: HOSPADM

## 2024-02-21 RX ORDER — PROPOFOL 10 MG/ML
INJECTION, EMULSION INTRAVENOUS AS NEEDED
Status: DISCONTINUED | OUTPATIENT
Start: 2024-02-21 | End: 2024-02-21

## 2024-02-21 RX ORDER — LIDOCAINE HYDROCHLORIDE 10 MG/ML
INJECTION, SOLUTION EPIDURAL; INFILTRATION; INTRACAUDAL; PERINEURAL AS NEEDED
Status: DISCONTINUED | OUTPATIENT
Start: 2024-02-21 | End: 2024-02-21

## 2024-02-21 RX ORDER — CEFAZOLIN SODIUM 1 G/50ML
1000 SOLUTION INTRAVENOUS ONCE
Status: COMPLETED | OUTPATIENT
Start: 2024-02-21 | End: 2024-02-21

## 2024-02-21 RX ORDER — HYDROCODONE BITARTRATE AND ACETAMINOPHEN 5; 325 MG/1; MG/1
1 TABLET ORAL EVERY 6 HOURS PRN
Status: DISCONTINUED | OUTPATIENT
Start: 2024-02-21 | End: 2024-02-21 | Stop reason: HOSPADM

## 2024-02-21 RX ADMIN — ACETAMINOPHEN 650 MG: 325 TABLET, FILM COATED ORAL at 10:52

## 2024-02-21 RX ADMIN — CEFAZOLIN SODIUM 1000 MG: 1 SOLUTION INTRAVENOUS at 07:33

## 2024-02-21 RX ADMIN — FENTANYL CITRATE 25 MCG: 50 INJECTION INTRAMUSCULAR; INTRAVENOUS at 07:36

## 2024-02-21 RX ADMIN — PHENYLEPHRINE HYDROCHLORIDE 30 MCG/MIN: 10 INJECTION INTRAVENOUS at 07:35

## 2024-02-21 RX ADMIN — LIDOCAINE HYDROCHLORIDE 50 MG: 10 INJECTION, SOLUTION EPIDURAL; INFILTRATION; INTRACAUDAL at 07:29

## 2024-02-21 RX ADMIN — PROPOFOL 80 MG: 10 INJECTION, EMULSION INTRAVENOUS at 07:29

## 2024-02-21 RX ADMIN — EPHEDRINE SULFATE 10 MG: 50 INJECTION INTRAVENOUS at 07:45

## 2024-02-21 RX ADMIN — FUROSEMIDE 5 MG: 10 INJECTION, SOLUTION INTRAMUSCULAR; INTRAVENOUS at 10:49

## 2024-02-21 RX ADMIN — ONDANSETRON 4 MG: 2 INJECTION INTRAMUSCULAR; INTRAVENOUS at 07:51

## 2024-02-21 RX ADMIN — PHENYLEPHRINE HYDROCHLORIDE 50 MCG/MIN: 10 INJECTION INTRAVENOUS at 07:41

## 2024-02-21 RX ADMIN — PHENYLEPHRINE HYDROCHLORIDE 80 MCG/MIN: 10 INJECTION INTRAVENOUS at 07:46

## 2024-02-21 RX ADMIN — PROPOFOL 20 MG: 10 INJECTION, EMULSION INTRAVENOUS at 07:31

## 2024-02-21 NOTE — ANESTHESIA POSTPROCEDURE EVALUATION
Post-Op Assessment Note    CV Status:  Stable    Pain management: adequate       Mental Status:  Alert and awake   Hydration Status:  Euvolemic   PONV Controlled:  Controlled   Airway Patency:  Patent     Post Op Vitals Reviewed: Yes    No anethesia notable event occurred.    Staff: PIETRO           BP   132/76   Temp   98.2   Pulse  78   Resp   18   SpO2   99

## 2024-02-21 NOTE — OP NOTE
OPERATIVE REPORT  PATIENT NAME: Demarco Medrano    :  1925  MRN: [id number]  Pt Location: [facility] OR ROOM 01    SURGERY DATE: 2024    Surgeons and Role:     * Cedrick Mcgraw MD - Primary     * Chika Panda MD - Assisting    Preop Diagnosis:  Inguinal hernia [K40.90]    Post-Op Diagnosis Codes:     * Inguinal hernia [K40.90]    Procedure(s):  Right - REPAIR HERNIA INGUINAL    Specimen(s):  * No specimens in log *    Estimated Blood Loss:   Minimal    Drains:  * No LDAs found *    Anesthesia Type:   General    Operative Indications:  Inguinal hernia [K40.90]    Independent, non-smoker, ASA 3, wound class I, BMI 22, weight 125, height 64    Acute deep vein thrombosis (DVT) of left femoral vein (HCC)    (+) Chronic deep vein thrombosis (DVT) of femoral vein of left lower extremity (HCC)   (+) Essential hypertension   (+) Hyperlipidemia       GI/HEPATIC   (+) Carcinoma of colon (HCC)       /RENAL   (+) Acute kidney injury superimposed on chronic kidney disease    (+) BPH (benign prostatic hyperplasia)         Complications:   None    Procedure and Technique:  Demarco Medrano is a 89 y.o. male was brought into the operative suite and identified visually and by arm band. The patient was placed in the supine position.  Careful attention was made towards padding and positioning of the extremities.  After sterile prep and drape, a timeout was completed. The prep was dry.  Antibiotics were provided. Athrombic pumps in place.    0.25% Marcaine with epinephrine was utilized throughout the procedure.  An incision was made  within the right inguinal region.  The incision was carried down through the skin and subcutaneous tissue. The superficial epigastric vein was clamped, ligated and  divided. . The external oblique fibers were identified.  The external ring was identified.  The external oblique fibers were then split in the direction of their fibers.  Hemostats were placed on the cut edges.  A self-retaining  retractor was then placed.    Exploration of the inguinal canal commenced.  The cremasteric fibers were then divided along the fiber direction of its fibers the cord structures.   The cord was encircled in a atraumatic Penrose drain and preserved during dissection.  Identification of a indirect and /      or direct inguinal hernia was performed. High dissection was completed at the level of the internal ring.  Preperitoneal dissection commenced identifying and preserving the inferior epigastric vessels.    A preperitoneal mesh was then inserted.  The branch of the genitofemoral nerve was divided in order to help prevent postoperative neuralgia.  The inguinal floor was then repaired with interrupted figure-of-eight 0 Vicryl suture.  The indirect inguinal ring was repositioned more superiorly while repairing the inguinal transversalis muscular floor.  An onlay mesh was then secured to the tendon overlying the lateral pubic tubercle The external oblique fascia was closed with a running 3-0 PDS suture.  Additional 0.25% Marcaine with epinephrine was injected over the ilioinguinal and iliohypogastric nerves.    3-0 Vicryl subcutaneous suture was placed into the Gm's fascia .   4-0 Monocryl suture was used for the subcuticular layer. Dermabond was then applied.    The patient was then awakened from general anesthesia and transferred to the recovery room in stable condition. Sponge and instrument count correct ×2.     I was present for the entire procedure.    Patient Disposition:  PACU         SIGNATURE: Cedrick Mcgraw MD  DATE: February 21, 2024  TIME: 8:54 AM

## 2024-02-21 NOTE — ANESTHESIA PREPROCEDURE EVALUATION
Procedure:  REPAIR HERNIA INGUINAL (Right: Groin)    Relevant Problems   CARDIO   (+) Acute deep vein thrombosis (DVT) of left femoral vein (HCC)   (+) Chronic deep vein thrombosis (DVT) of femoral vein of left lower extremity (HCC)   (+) Essential hypertension   (+) Hyperlipidemia      GI/HEPATIC   (+) Carcinoma of colon (HCC)      /RENAL   (+) Acute kidney injury superimposed on chronic kidney disease    (+) BPH (benign prostatic hyperplasia)        Physical Exam    Airway    Mallampati score: III  TM Distance: >3 FB  Neck ROM: full     Dental   Comment: Poor dentition, multiple decayed/broken teeth, No notable dental hx     Cardiovascular  Rhythm: regular, Rate: normal    Pulmonary   Breath sounds clear to auscultation    Other Findings        Anesthesia Plan  ASA Score- 3     Anesthesia Type- general with ASA Monitors.         Additional Monitors:     Airway Plan: LMA.           Plan Factors-Exercise tolerance (METS): >4 METS.    Chart reviewed. EKG reviewed.  Existing labs reviewed. Patient summary reviewed.    Patient is not a current smoker.              Induction- intravenous.    Postoperative Plan- Plan for postoperative opioid use.     Informed Consent- Anesthetic plan and risks discussed with patient.  I personally reviewed this patient with the CRNA. Discussed and agreed on the Anesthesia Plan with the CRNA..

## 2024-02-21 NOTE — DISCHARGE INSTR - AVS FIRST PAGE
Please call the office when you leave to schedule an appointment for 2 weeks.              Please call [phone]. [address], [address], [place], [address]. [address] between [email or web address] and [email or web address].     Activity:    May lift 10 lb as many times as desired the 1st week,       20 lb in 2 weeks,       30 lb in 3 weeks.                Walking is encouraged  Normal daily activities including climbing steps are okay  Do not engage in strenuous activity ( sit-ups or crutches) or contact sports for 4-6 weeks post-operatively    Return to Work:   Okay to return to work when you feel well if you desire.        Diet:   You may return to your normal healthy diet.    Wound Care:  Your wound is closed with dissolvable stitches and glue.  It is okay to shower. Wash incision gently with soap and water and pat dry. Do not soak incisions in bath water or swim for two weeks. Do not apply any creams or ointments.    Pain Medication:   Please take as directed if needed. May use Advil or Motrin in addition.  Recall, the pain medicine and anesthesia is associated with constipation.    No driving while taking narcotic pain medications.      Other:  It is normal to developed a “healing ridge” / firm incision after surgery.  This is your body making scar tissue.  It is a good sign  Constipation is very common after general anesthesia.  Please use milk of magnesia as needed in order to help prevent constipation.  It is normal to get bruising after surgery.  If you have questions after discharge please call the office.    If you have increased pain, fever >101.5, increased drainage, redness or a bad smell at your surgery site, please call us immediately or come directly to the Emergency Room

## 2024-02-21 NOTE — INTERVAL H&P NOTE
H&P reviewed. After examining the patient I find no changes in the patients condition since the H&P had been written.    Vitals:    02/21/24 0642   BP: 140/71   Pulse: 87   Resp: 18   Temp: 98.3 °F (36.8 °C)   SpO2: 96%

## 2024-03-11 ENCOUNTER — OFFICE VISIT (OUTPATIENT)
Dept: SURGERY | Facility: CLINIC | Age: 89
End: 2024-03-11

## 2024-03-11 DIAGNOSIS — K40.90 NON-RECURRENT UNILATERAL INGUINAL HERNIA WITHOUT OBSTRUCTION OR GANGRENE: Primary | ICD-10-CM

## 2024-03-11 PROCEDURE — 99024 POSTOP FOLLOW-UP VISIT: CPT | Performed by: SURGERY

## 2024-03-11 NOTE — PROGRESS NOTES
Assessment/Plan: Patient is status post right inguinal hernia repair.  He returns for follow-up visit.  Overall he feels well.  Incisions healing nicely.  All questions answered.    There are no diagnoses linked to this encounter.        Postoperative restrictions reviewed. All questions answered.       ______________________________________________________  HPI: Patient presents post operatively.  Right inguinal hernia repair 2/21/2024.               ROS:  General ROS: negative for - chills, fatigue, fever or night sweats, weight loss  Respiratory ROS: no cough, shortness of breath, or wheezing  Cardiovascular ROS: no chest pain or dyspnea on exertion  Genito-Urinary ROS: no dysuria, trouble voiding, or hematuria  Musculoskeletal ROS: negative for - gait disturbance, joint pain or muscle pain  Neurological ROS: no TIA or stroke symptoms  GI ROS: see HPI  Skin ROS: no new rashes or lesions   Lymphatic ROS: no new adenopathy noted by pt.   GYN ROS: see HPI, no new GYN history or bleeding noted  Psy ROS: no new mental or behavioral disturbances         Patient Active Problem List   Diagnosis    Hypoglycemia    Acute encephalopathy    Acute kidney injury superimposed on chronic kidney disease     Essential hypertension    Hyperlipidemia    BPH (benign prostatic hyperplasia)    SIRS (systemic inflammatory response syndrome) (HCC)    Alcohol abuse    Acute deep vein thrombosis (DVT) of left femoral vein (HCC)    Chronic deep vein thrombosis (DVT) of femoral vein of left lower extremity (HCC)    Non-recurrent unilateral inguinal hernia without obstruction or gangrene    Carcinoma of colon (HCC)       Allergies:  Patient has no known allergies.      Current Outpatient Medications:     acetaminophen (TYLENOL) 500 mg tablet, , Disp: , Rfl:     allopurinol (ZYLOPRIM) 100 mg tablet, Take 100 mg by mouth daily, Disp: , Rfl:     allopurinol (ZYLOPRIM) 300 mg tablet, Take 150 mg by mouth daily at bedtime, Disp: , Rfl:      "amLODIPine (NORVASC) 10 mg tablet, Take 10 mg by mouth daily, Disp: , Rfl:     bisacodyl (DULCOLAX) 5 mg EC tablet, Take 5 mg by mouth daily as needed for constipation Take 2 tablets night before surgery, Disp: , Rfl:     bisacodyl (FLEET) 10 MG/30ML ENEM, Insert 10 mg into the rectum once, Disp: , Rfl:     Bismuth Tribromoph-Petrolatum (Xeroform Petrolat Gauze 5\"x9\") MISC, , Disp: , Rfl:     folic acid (FOLVITE) 1 mg tablet, , Disp: , Rfl:     hydrocortisone in white petrolatum-mineral oil cream, Apply topically, Disp: , Rfl:     lisinopril (ZESTRIL) 10 mg tablet, Take 10 mg by mouth daily, Disp: , Rfl:     lisinopril (ZESTRIL) 20 mg tablet, 20 mg, Disp: , Rfl:     loratadine (CLARITIN) 10 mg tablet, Take 10 mg by mouth daily, Disp: , Rfl:     magnesium hydroxide (Milk of Magnesia) 400 mg/5 mL oral suspension, Take by mouth daily as needed for constipation, Disp: , Rfl:     Melatonin 5 MG TABS, Take by mouth, Disp: , Rfl:     miconazole (MICOTIN) 2 % powder, , Disp: , Rfl:     mineral oil-hydrophilic petrolatum (AQUAPHOR) ointment, Apply topically as needed for dry skin, Disp: , Rfl:     Multiple Vitamin (Tab-A-Jeffrey) TABS, , Disp: , Rfl:     polyethylene glycol-propylene glycol (Lubricant Eye Drops) 0.4-0.3 %, every 3 (three) hours as needed, Disp: , Rfl:     pravastatin (PRAVACHOL) 40 mg tablet, , Disp: , Rfl:     simvastatin (ZOCOR) 20 mg tablet, Take 20 mg by mouth every evening, Disp: , Rfl:     tamsulosin (FLOMAX) 0.4 mg, Take 0.4 mg by mouth daily with dinner, Disp: , Rfl:     tamsulosin (FLOMAX) 0.4 mg, Take 1 capsule (0.4 mg total) by mouth daily with dinner Begin 5 days before surgery.  Continue for 5 days after surgery., Disp: 10 capsule, Rfl: 1    Past Medical History:   Diagnosis Date    Cancer (HCC)     CKD (chronic kidney disease) stage 3, GFR 30-59 ml/min (HCC)     Deep vein thrombosis (HCC)     Essential (primary) hypertension     Hyperlipidemia     Polyp of colon     Venous insufficiency of lower " extremity, unspecified laterality        Past Surgical History:   Procedure Laterality Date    COLONOSCOPY      HI RPR 1ST INGUN HRNA AGE 5 YRS/> REDUCIBLE Right 02/21/2024    Procedure: REPAIR HERNIA INGUINAL;  Surgeon: Cedrick Mcgraw MD;  Location: [facility];  Service: General       No family history on file.     reports that he quit smoking about 74 years ago. His smoking use included cigarettes. He started smoking about 79 years ago. He has a 2.5 pack-year smoking history. He has never used smokeless tobacco. He reports that he does not currently use alcohol after a past usage of about 35.0 standard drinks of alcohol per week. He reports that he does not use drugs.    PHYSICAL EXAM    There were no vitals taken for this visit.    General: normal, cooperative, no distress  Abdominal: soft, nondistended, or nontender  Incision: clean, dry, and intact and healing well      Cedrick Mcgraw MD    Date: 3/11/2024 Time: 2:32 PM

## (undated) DEVICE — ADHESIVE SKIN HIGH VISCOSITY EXOFIN 1ML

## (undated) DEVICE — CHLORAPREP HI-LITE 26ML ORANGE

## (undated) DEVICE — SUT MONOCRYL 4-0 PS-2 27 IN Y426H

## (undated) DEVICE — SUT VICRYL 3-0 SH 27 IN J416H

## (undated) DEVICE — GLOVE SRG BIOGEL ECLIPSE 7

## (undated) DEVICE — BETHLEHEM UNIVERSAL MINOR GEN: Brand: CARDINAL HEALTH

## (undated) DEVICE — SUT PDS II 3-0 SH 27 IN Z316H

## (undated) DEVICE — INTENDED FOR TISSUE SEPARATION, AND OTHER PROCEDURES THAT REQUIRE A SHARP SURGICAL BLADE TO PUNCTURE OR CUT.: Brand: BARD-PARKER SAFETY BLADES SIZE 15, STERILE

## (undated) DEVICE — BULB SYRINGE,IRRIGATION WITH PROTECTIVE CAP: Brand: DOVER

## (undated) DEVICE — DECANTER: Brand: UNBRANDED

## (undated) DEVICE — ELECTRODE BLADE MOD E-Z CLEAN 2.5IN 6.4CM -0012M

## (undated) DEVICE — TOWEL SURG XR DETECT GREEN STRL RFD

## (undated) DEVICE — UNDYED BRAIDED (POLYGLACTIN 910), SYNTHETIC ABSORBABLE SUTURE: Brand: COATED VICRYL

## (undated) DEVICE — NEEDLE 23G X 1 1/2 SAFETY-GLIDE THIN WALL

## (undated) DEVICE — SUT VICRYL 2-0 REEL 54 IN J286G

## (undated) DEVICE — PENCIL ELECTROSURG E-Z CLEAN -0035H